# Patient Record
Sex: FEMALE | Race: WHITE | NOT HISPANIC OR LATINO | Employment: OTHER | URBAN - METROPOLITAN AREA
[De-identification: names, ages, dates, MRNs, and addresses within clinical notes are randomized per-mention and may not be internally consistent; named-entity substitution may affect disease eponyms.]

---

## 2018-07-19 ENCOUNTER — TELEPHONE (OUTPATIENT)
Dept: MEDICAL GROUP | Age: 73
End: 2018-07-19

## 2018-07-20 ENCOUNTER — OFFICE VISIT (OUTPATIENT)
Dept: MEDICAL GROUP | Age: 73
End: 2018-07-20
Payer: MEDICARE

## 2018-07-20 VITALS — BODY MASS INDEX: 22.88 KG/M2 | WEIGHT: 134 LBS | HEIGHT: 64 IN

## 2018-07-20 DIAGNOSIS — F43.10 PTSD (POST-TRAUMATIC STRESS DISORDER): ICD-10-CM

## 2018-07-20 PROBLEM — F43.21 GRIEF REACTION: Status: ACTIVE | Noted: 2018-07-20

## 2018-07-20 PROCEDURE — 99204 OFFICE O/P NEW MOD 45 MIN: CPT | Performed by: FAMILY MEDICINE

## 2018-07-20 RX ORDER — CITALOPRAM HYDROBROMIDE 10 MG/1
10 TABLET ORAL DAILY
Qty: 90 TAB | Refills: 0 | Status: SHIPPED | OUTPATIENT
Start: 2018-07-20 | End: 2019-04-24

## 2018-07-20 ASSESSMENT — PATIENT HEALTH QUESTIONNAIRE - PHQ9
SUM OF ALL RESPONSES TO PHQ QUESTIONS 1-9: 19
CLINICAL INTERPRETATION OF PHQ2 SCORE: 4
5. POOR APPETITE OR OVEREATING: 1 - SEVERAL DAYS
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
## 2019-04-24 ENCOUNTER — OFFICE VISIT (OUTPATIENT)
Dept: MEDICAL GROUP | Age: 74
End: 2019-04-24

## 2019-04-24 VITALS
DIASTOLIC BLOOD PRESSURE: 70 MMHG | WEIGHT: 140 LBS | SYSTOLIC BLOOD PRESSURE: 132 MMHG | BODY MASS INDEX: 23.9 KG/M2 | HEART RATE: 69 BPM | OXYGEN SATURATION: 97 % | HEIGHT: 64 IN | TEMPERATURE: 97.5 F

## 2019-04-24 DIAGNOSIS — F43.10 PTSD (POST-TRAUMATIC STRESS DISORDER): ICD-10-CM

## 2019-04-24 DIAGNOSIS — Z00.00 MEDICARE ANNUAL WELLNESS VISIT, SUBSEQUENT: Primary | ICD-10-CM

## 2019-04-24 PROBLEM — F43.21 GRIEF REACTION: Status: RESOLVED | Noted: 2018-07-20 | Resolved: 2019-04-24

## 2019-04-24 PROCEDURE — G0439 PPPS, SUBSEQ VISIT: HCPCS | Performed by: FAMILY MEDICINE

## 2019-04-24 RX ORDER — CITALOPRAM HYDROBROMIDE 10 MG/1
10 TABLET ORAL DAILY
Qty: 90 TAB | Refills: 3 | Status: SHIPPED | OUTPATIENT
Start: 2019-04-24

## 2019-04-24 ASSESSMENT — PATIENT HEALTH QUESTIONNAIRE - PHQ9: CLINICAL INTERPRETATION OF PHQ2 SCORE: 0

## 2019-04-24 ASSESSMENT — ACTIVITIES OF DAILY LIVING (ADL): BATHING_REQUIRES_ASSISTANCE: 0

## 2019-04-24 ASSESSMENT — ENCOUNTER SYMPTOMS: GENERAL WELL-BEING: GOOD
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
## 2021-01-11 DIAGNOSIS — Z23 NEED FOR VACCINATION: ICD-10-CM

## 2024-05-04 ENCOUNTER — ANESTHESIA EVENT (OUTPATIENT)
Dept: SURGERY | Facility: MEDICAL CENTER | Age: 79
DRG: 481 | End: 2024-05-04
Payer: MEDICARE

## 2024-05-04 ENCOUNTER — APPOINTMENT (OUTPATIENT)
Dept: RADIOLOGY | Facility: MEDICAL CENTER | Age: 79
DRG: 481 | End: 2024-05-04
Attending: STUDENT IN AN ORGANIZED HEALTH CARE EDUCATION/TRAINING PROGRAM
Payer: MEDICARE

## 2024-05-04 ENCOUNTER — APPOINTMENT (OUTPATIENT)
Dept: RADIOLOGY | Facility: MEDICAL CENTER | Age: 79
DRG: 481 | End: 2024-05-04
Attending: ORTHOPAEDIC SURGERY
Payer: MEDICARE

## 2024-05-04 ENCOUNTER — HOSPITAL ENCOUNTER (INPATIENT)
Facility: MEDICAL CENTER | Age: 79
LOS: 3 days | DRG: 481 | End: 2024-05-07
Attending: STUDENT IN AN ORGANIZED HEALTH CARE EDUCATION/TRAINING PROGRAM | Admitting: STUDENT IN AN ORGANIZED HEALTH CARE EDUCATION/TRAINING PROGRAM
Payer: MEDICARE

## 2024-05-04 ENCOUNTER — ANESTHESIA (OUTPATIENT)
Dept: SURGERY | Facility: MEDICAL CENTER | Age: 79
DRG: 481 | End: 2024-05-04
Payer: MEDICARE

## 2024-05-04 DIAGNOSIS — S72.001A HIP FRACTURE REQUIRING OPERATIVE REPAIR, RIGHT, CLOSED, INITIAL ENCOUNTER (HCC): ICD-10-CM

## 2024-05-04 DIAGNOSIS — I15.9 SECONDARY HYPERTENSION: ICD-10-CM

## 2024-05-04 DIAGNOSIS — N39.0 ACUTE UTI: ICD-10-CM

## 2024-05-04 DIAGNOSIS — S72.001A CLOSED FRACTURE OF RIGHT HIP, INITIAL ENCOUNTER (HCC): ICD-10-CM

## 2024-05-04 PROBLEM — I10 HYPERTENSION: Status: ACTIVE | Noted: 2024-05-04

## 2024-05-04 PROBLEM — R73.9 HYPERGLYCEMIA: Status: ACTIVE | Noted: 2024-05-04

## 2024-05-04 PROBLEM — M25.511 ACUTE PAIN OF RIGHT SHOULDER: Status: ACTIVE | Noted: 2024-05-04

## 2024-05-04 PROBLEM — E86.0 DEHYDRATION: Status: ACTIVE | Noted: 2024-05-04

## 2024-05-04 PROBLEM — Z71.89 ACP (ADVANCE CARE PLANNING): Status: ACTIVE | Noted: 2024-05-04

## 2024-05-04 LAB
ANION GAP SERPL CALC-SCNC: 12 MMOL/L (ref 7–16)
BUN SERPL-MCNC: 29 MG/DL (ref 8–22)
CALCIUM SERPL-MCNC: 8.9 MG/DL (ref 8.5–10.5)
CHLORIDE SERPL-SCNC: 107 MMOL/L (ref 96–112)
CK SERPL-CCNC: 213 U/L (ref 0–154)
CO2 SERPL-SCNC: 22 MMOL/L (ref 20–33)
CREAT SERPL-MCNC: 0.56 MG/DL (ref 0.5–1.4)
CRP SERPL HS-MCNC: <0.3 MG/DL (ref 0–0.75)
EKG IMPRESSION: NORMAL
ERYTHROCYTE [DISTWIDTH] IN BLOOD BY AUTOMATED COUNT: 46 FL (ref 35.9–50)
EST. AVERAGE GLUCOSE BLD GHB EST-MCNC: 117 MG/DL
GFR SERPLBLD CREATININE-BSD FMLA CKD-EPI: 92 ML/MIN/1.73 M 2
GLUCOSE SERPL-MCNC: 150 MG/DL (ref 65–99)
HBA1C MFR BLD: 5.7 % (ref 4–5.6)
HCT VFR BLD AUTO: 36 % (ref 37–47)
HGB BLD-MCNC: 12.2 G/DL (ref 12–16)
MCH RBC QN AUTO: 30.3 PG (ref 27–33)
MCHC RBC AUTO-ENTMCNC: 33.9 G/DL (ref 32.2–35.5)
MCV RBC AUTO: 89.6 FL (ref 81.4–97.8)
PLATELET # BLD AUTO: 169 K/UL (ref 164–446)
PMV BLD AUTO: 10.4 FL (ref 9–12.9)
POTASSIUM SERPL-SCNC: 3.7 MMOL/L (ref 3.6–5.5)
PROCALCITONIN SERPL-MCNC: <0.05 NG/ML
RBC # BLD AUTO: 4.02 M/UL (ref 4.2–5.4)
SODIUM SERPL-SCNC: 141 MMOL/L (ref 135–145)
WBC # BLD AUTO: 12.2 K/UL (ref 4.8–10.8)

## 2024-05-04 PROCEDURE — 99222 1ST HOSP IP/OBS MODERATE 55: CPT | Mod: 57 | Performed by: ORTHOPAEDIC SURGERY

## 2024-05-04 PROCEDURE — 0QS606Z REPOSITION RIGHT UPPER FEMUR WITH INTRAMEDULLARY INTERNAL FIXATION DEVICE, OPEN APPROACH: ICD-10-PCS | Performed by: ORTHOPAEDIC SURGERY

## 2024-05-04 PROCEDURE — 93010 ELECTROCARDIOGRAM REPORT: CPT | Performed by: INTERNAL MEDICINE

## 2024-05-04 PROCEDURE — 99497 ADVNCD CARE PLAN 30 MIN: CPT | Performed by: STUDENT IN AN ORGANIZED HEALTH CARE EDUCATION/TRAINING PROGRAM

## 2024-05-04 PROCEDURE — 99223 1ST HOSP IP/OBS HIGH 75: CPT | Mod: AI,25 | Performed by: STUDENT IN AN ORGANIZED HEALTH CARE EDUCATION/TRAINING PROGRAM

## 2024-05-04 PROCEDURE — 27235 TREAT THIGH FRACTURE: CPT | Mod: RT | Performed by: ORTHOPAEDIC SURGERY

## 2024-05-04 DEVICE — SCREW CANNULATED FULLY THREADED 7.3MM X 75MM (3TX3=9): Type: IMPLANTABLE DEVICE | Site: HIP | Status: FUNCTIONAL

## 2024-05-04 DEVICE — SCREW CANNULATED FULLY THREADED 7.3MM X 80MM (3TX3=9): Type: IMPLANTABLE DEVICE | Site: HIP | Status: FUNCTIONAL

## 2024-05-04 DEVICE — SCREW CANNULATED FULLY THREADED 7.3MM X 85MM (3TX3=9): Type: IMPLANTABLE DEVICE | Site: HIP | Status: FUNCTIONAL

## 2024-05-04 RX ORDER — CEFAZOLIN SODIUM 1 G/3ML
INJECTION, POWDER, FOR SOLUTION INTRAMUSCULAR; INTRAVENOUS PRN
Status: DISCONTINUED | OUTPATIENT
Start: 2024-05-04 | End: 2024-05-04 | Stop reason: SURG

## 2024-05-04 RX ORDER — BISACODYL 10 MG
10 SUPPOSITORY, RECTAL RECTAL
Status: DISCONTINUED | OUTPATIENT
Start: 2024-05-04 | End: 2024-05-07 | Stop reason: HOSPADM

## 2024-05-04 RX ORDER — ASPIRIN 81 MG/1
81 TABLET, CHEWABLE ORAL
COMMUNITY

## 2024-05-04 RX ORDER — ASPIRIN 81 MG/1
81 TABLET ORAL
Status: SHIPPED | COMMUNITY
End: 2024-05-04

## 2024-05-04 RX ORDER — ACETAMINOPHEN 325 MG/1
650 TABLET ORAL EVERY 6 HOURS
Status: DISCONTINUED | OUTPATIENT
Start: 2024-05-04 | End: 2024-05-04

## 2024-05-04 RX ORDER — DEXAMETHASONE SODIUM PHOSPHATE 4 MG/ML
4 INJECTION, SOLUTION INTRA-ARTICULAR; INTRALESIONAL; INTRAMUSCULAR; INTRAVENOUS; SOFT TISSUE
Status: DISCONTINUED | OUTPATIENT
Start: 2024-05-04 | End: 2024-05-07 | Stop reason: HOSPADM

## 2024-05-04 RX ORDER — SODIUM CHLORIDE, SODIUM LACTATE, POTASSIUM CHLORIDE, AND CALCIUM CHLORIDE .6; .31; .03; .02 G/100ML; G/100ML; G/100ML; G/100ML
500 INJECTION, SOLUTION INTRAVENOUS
Status: DISCONTINUED | OUTPATIENT
Start: 2024-05-04 | End: 2024-05-07 | Stop reason: HOSPADM

## 2024-05-04 RX ORDER — HYDRALAZINE HYDROCHLORIDE 20 MG/ML
10 INJECTION INTRAMUSCULAR; INTRAVENOUS EVERY 4 HOURS PRN
Status: DISCONTINUED | OUTPATIENT
Start: 2024-05-04 | End: 2024-05-07 | Stop reason: HOSPADM

## 2024-05-04 RX ORDER — ACETAMINOPHEN 325 MG/1
650 TABLET ORAL EVERY 6 HOURS PRN
Status: DISCONTINUED | OUTPATIENT
Start: 2024-05-09 | End: 2024-05-07 | Stop reason: HOSPADM

## 2024-05-04 RX ORDER — OXYCODONE HYDROCHLORIDE 5 MG/1
2.5 TABLET ORAL
Status: DISCONTINUED | OUTPATIENT
Start: 2024-05-04 | End: 2024-05-04

## 2024-05-04 RX ORDER — ENOXAPARIN SODIUM 100 MG/ML
40 INJECTION SUBCUTANEOUS
Status: DISCONTINUED | OUTPATIENT
Start: 2024-05-05 | End: 2024-05-07 | Stop reason: HOSPADM

## 2024-05-04 RX ORDER — MORPHINE SULFATE 4 MG/ML
4 INJECTION INTRAVENOUS ONCE
Status: COMPLETED | OUTPATIENT
Start: 2024-05-04 | End: 2024-05-04

## 2024-05-04 RX ORDER — ACETAMINOPHEN 325 MG/1
650 TABLET ORAL EVERY 6 HOURS PRN
Status: DISCONTINUED | OUTPATIENT
Start: 2024-05-04 | End: 2024-05-04

## 2024-05-04 RX ORDER — DEXAMETHASONE SODIUM PHOSPHATE 4 MG/ML
INJECTION, SOLUTION INTRA-ARTICULAR; INTRALESIONAL; INTRAMUSCULAR; INTRAVENOUS; SOFT TISSUE PRN
Status: DISCONTINUED | OUTPATIENT
Start: 2024-05-04 | End: 2024-05-04 | Stop reason: SURG

## 2024-05-04 RX ORDER — OMEPRAZOLE 20 MG/1
20 CAPSULE, DELAYED RELEASE ORAL 2 TIMES DAILY
Status: DISCONTINUED | OUTPATIENT
Start: 2024-05-04 | End: 2024-05-07 | Stop reason: HOSPADM

## 2024-05-04 RX ORDER — HYDROMORPHONE HYDROCHLORIDE 1 MG/ML
0.5 INJECTION, SOLUTION INTRAMUSCULAR; INTRAVENOUS; SUBCUTANEOUS
Status: DISCONTINUED | OUTPATIENT
Start: 2024-05-04 | End: 2024-05-07 | Stop reason: HOSPADM

## 2024-05-04 RX ORDER — AMOXICILLIN 250 MG
1 CAPSULE ORAL NIGHTLY
Status: DISCONTINUED | OUTPATIENT
Start: 2024-05-04 | End: 2024-05-07 | Stop reason: HOSPADM

## 2024-05-04 RX ORDER — LABETALOL HYDROCHLORIDE 5 MG/ML
5 INJECTION, SOLUTION INTRAVENOUS
Status: DISCONTINUED | OUTPATIENT
Start: 2024-05-04 | End: 2024-05-04 | Stop reason: HOSPADM

## 2024-05-04 RX ORDER — ENOXAPARIN SODIUM 100 MG/ML
40 INJECTION SUBCUTANEOUS DAILY
Status: DISCONTINUED | OUTPATIENT
Start: 2024-05-05 | End: 2024-05-04

## 2024-05-04 RX ORDER — POLYETHYLENE GLYCOL 3350 17 G/17G
1 POWDER, FOR SOLUTION ORAL
Status: DISCONTINUED | OUTPATIENT
Start: 2024-05-04 | End: 2024-05-04

## 2024-05-04 RX ORDER — OXYCODONE HCL 5 MG/5 ML
5 SOLUTION, ORAL ORAL
Status: COMPLETED | OUTPATIENT
Start: 2024-05-04 | End: 2024-05-04

## 2024-05-04 RX ORDER — KETOROLAC TROMETHAMINE 15 MG/ML
INJECTION, SOLUTION INTRAMUSCULAR; INTRAVENOUS PRN
Status: DISCONTINUED | OUTPATIENT
Start: 2024-05-04 | End: 2024-05-04 | Stop reason: SURG

## 2024-05-04 RX ORDER — ACETAMINOPHEN 325 MG/1
650 TABLET ORAL EVERY 6 HOURS
Status: DISCONTINUED | OUTPATIENT
Start: 2024-05-04 | End: 2024-05-07 | Stop reason: HOSPADM

## 2024-05-04 RX ORDER — OXYCODONE HYDROCHLORIDE 5 MG/1
10 TABLET ORAL
Status: DISCONTINUED | OUTPATIENT
Start: 2024-05-04 | End: 2024-05-07 | Stop reason: HOSPADM

## 2024-05-04 RX ORDER — IPRATROPIUM BROMIDE AND ALBUTEROL SULFATE 2.5; .5 MG/3ML; MG/3ML
3 SOLUTION RESPIRATORY (INHALATION)
Status: DISCONTINUED | OUTPATIENT
Start: 2024-05-04 | End: 2024-05-07 | Stop reason: HOSPADM

## 2024-05-04 RX ORDER — METHOCARBAMOL 750 MG/1
750 TABLET, FILM COATED ORAL 4 TIMES DAILY PRN
Status: DISCONTINUED | OUTPATIENT
Start: 2024-05-04 | End: 2024-05-07 | Stop reason: HOSPADM

## 2024-05-04 RX ORDER — SODIUM CHLORIDE, SODIUM LACTATE, POTASSIUM CHLORIDE, CALCIUM CHLORIDE 600; 310; 30; 20 MG/100ML; MG/100ML; MG/100ML; MG/100ML
INJECTION, SOLUTION INTRAVENOUS CONTINUOUS
Status: DISCONTINUED | OUTPATIENT
Start: 2024-05-04 | End: 2024-05-04 | Stop reason: HOSPADM

## 2024-05-04 RX ORDER — DOCUSATE SODIUM 100 MG/1
100 CAPSULE, LIQUID FILLED ORAL 2 TIMES DAILY
Status: DISCONTINUED | OUTPATIENT
Start: 2024-05-04 | End: 2024-05-07 | Stop reason: HOSPADM

## 2024-05-04 RX ORDER — SODIUM CHLORIDE, SODIUM LACTATE, POTASSIUM CHLORIDE, CALCIUM CHLORIDE 600; 310; 30; 20 MG/100ML; MG/100ML; MG/100ML; MG/100ML
INJECTION, SOLUTION INTRAVENOUS
Status: DISCONTINUED | OUTPATIENT
Start: 2024-05-04 | End: 2024-05-04 | Stop reason: SURG

## 2024-05-04 RX ORDER — LABETALOL HYDROCHLORIDE 5 MG/ML
10 INJECTION, SOLUTION INTRAVENOUS EVERY 4 HOURS PRN
Status: DISCONTINUED | OUTPATIENT
Start: 2024-05-04 | End: 2024-05-07 | Stop reason: HOSPADM

## 2024-05-04 RX ORDER — HYDRALAZINE HYDROCHLORIDE 20 MG/ML
5 INJECTION INTRAMUSCULAR; INTRAVENOUS
Status: DISCONTINUED | OUTPATIENT
Start: 2024-05-04 | End: 2024-05-04 | Stop reason: HOSPADM

## 2024-05-04 RX ORDER — LIDOCAINE 4 G/G
1 PATCH TOPICAL EVERY 24 HOURS
Status: DISCONTINUED | OUTPATIENT
Start: 2024-05-04 | End: 2024-05-07 | Stop reason: HOSPADM

## 2024-05-04 RX ORDER — OXYCODONE HCL 5 MG/5 ML
2.5 SOLUTION, ORAL ORAL
Status: COMPLETED | OUTPATIENT
Start: 2024-05-04 | End: 2024-05-04

## 2024-05-04 RX ORDER — HALOPERIDOL 5 MG/ML
1 INJECTION INTRAMUSCULAR EVERY 6 HOURS PRN
Status: DISCONTINUED | OUTPATIENT
Start: 2024-05-04 | End: 2024-05-05

## 2024-05-04 RX ORDER — OXYCODONE HYDROCHLORIDE 5 MG/1
5 TABLET ORAL
Status: DISCONTINUED | OUTPATIENT
Start: 2024-05-04 | End: 2024-05-04

## 2024-05-04 RX ORDER — ENEMA 19; 7 G/133ML; G/133ML
1 ENEMA RECTAL
Status: DISCONTINUED | OUTPATIENT
Start: 2024-05-04 | End: 2024-05-07 | Stop reason: HOSPADM

## 2024-05-04 RX ORDER — ONDANSETRON 2 MG/ML
4 INJECTION INTRAMUSCULAR; INTRAVENOUS EVERY 4 HOURS PRN
Status: DISCONTINUED | OUTPATIENT
Start: 2024-05-04 | End: 2024-05-07 | Stop reason: HOSPADM

## 2024-05-04 RX ORDER — ONDANSETRON 4 MG/1
4 TABLET, ORALLY DISINTEGRATING ORAL EVERY 4 HOURS PRN
Status: DISCONTINUED | OUTPATIENT
Start: 2024-05-04 | End: 2024-05-07 | Stop reason: HOSPADM

## 2024-05-04 RX ORDER — KETOROLAC TROMETHAMINE 15 MG/ML
15 INJECTION, SOLUTION INTRAMUSCULAR; INTRAVENOUS EVERY 6 HOURS
Status: DISCONTINUED | OUTPATIENT
Start: 2024-05-04 | End: 2024-05-04

## 2024-05-04 RX ORDER — POLYETHYLENE GLYCOL 3350 17 G/17G
1 POWDER, FOR SOLUTION ORAL 2 TIMES DAILY PRN
Status: DISCONTINUED | OUTPATIENT
Start: 2024-05-04 | End: 2024-05-07 | Stop reason: HOSPADM

## 2024-05-04 RX ORDER — DIPHENHYDRAMINE HYDROCHLORIDE 50 MG/ML
12.5 INJECTION INTRAMUSCULAR; INTRAVENOUS
Status: DISCONTINUED | OUTPATIENT
Start: 2024-05-04 | End: 2024-05-04 | Stop reason: HOSPADM

## 2024-05-04 RX ORDER — OXYCODONE HYDROCHLORIDE 5 MG/1
5 TABLET ORAL
Status: DISCONTINUED | OUTPATIENT
Start: 2024-05-04 | End: 2024-05-07 | Stop reason: HOSPADM

## 2024-05-04 RX ORDER — IBUPROFEN 200 MG
800 TABLET ORAL 3 TIMES DAILY PRN
Status: DISCONTINUED | OUTPATIENT
Start: 2024-05-07 | End: 2024-05-07 | Stop reason: HOSPADM

## 2024-05-04 RX ORDER — ACETAMINOPHEN 325 MG/1
650 TABLET ORAL EVERY 6 HOURS PRN
Status: DISCONTINUED | OUTPATIENT
Start: 2024-05-09 | End: 2024-05-04

## 2024-05-04 RX ORDER — HYDROMORPHONE HYDROCHLORIDE 1 MG/ML
0.25 INJECTION, SOLUTION INTRAMUSCULAR; INTRAVENOUS; SUBCUTANEOUS
Status: DISCONTINUED | OUTPATIENT
Start: 2024-05-04 | End: 2024-05-04

## 2024-05-04 RX ORDER — ONDANSETRON 2 MG/ML
INJECTION INTRAMUSCULAR; INTRAVENOUS PRN
Status: DISCONTINUED | OUTPATIENT
Start: 2024-05-04 | End: 2024-05-04 | Stop reason: SURG

## 2024-05-04 RX ORDER — HALOPERIDOL 5 MG/ML
1 INJECTION INTRAMUSCULAR
Status: DISCONTINUED | OUTPATIENT
Start: 2024-05-04 | End: 2024-05-04 | Stop reason: HOSPADM

## 2024-05-04 RX ORDER — AMOXICILLIN 250 MG
2 CAPSULE ORAL EVERY EVENING
Status: DISCONTINUED | OUTPATIENT
Start: 2024-05-04 | End: 2024-05-04

## 2024-05-04 RX ORDER — EPHEDRINE SULFATE 50 MG/ML
5 INJECTION, SOLUTION INTRAVENOUS
Status: DISCONTINUED | OUTPATIENT
Start: 2024-05-04 | End: 2024-05-04 | Stop reason: HOSPADM

## 2024-05-04 RX ORDER — KETOROLAC TROMETHAMINE 15 MG/ML
15 INJECTION, SOLUTION INTRAMUSCULAR; INTRAVENOUS EVERY 6 HOURS
Status: DISCONTINUED | OUTPATIENT
Start: 2024-05-04 | End: 2024-05-07 | Stop reason: HOSPADM

## 2024-05-04 RX ORDER — VITAMIN B COMPLEX
1000 TABLET ORAL DAILY
Status: DISCONTINUED | OUTPATIENT
Start: 2024-05-04 | End: 2024-05-07 | Stop reason: HOSPADM

## 2024-05-04 RX ORDER — SCOLOPAMINE TRANSDERMAL SYSTEM 1 MG/1
1 PATCH, EXTENDED RELEASE TRANSDERMAL
Status: DISCONTINUED | OUTPATIENT
Start: 2024-05-04 | End: 2024-05-07 | Stop reason: HOSPADM

## 2024-05-04 RX ORDER — DIPHENHYDRAMINE HYDROCHLORIDE 50 MG/ML
25 INJECTION INTRAMUSCULAR; INTRAVENOUS EVERY 6 HOURS PRN
Status: DISCONTINUED | OUTPATIENT
Start: 2024-05-04 | End: 2024-05-07 | Stop reason: HOSPADM

## 2024-05-04 RX ORDER — SODIUM CHLORIDE 9 MG/ML
INJECTION, SOLUTION INTRAVENOUS CONTINUOUS
Status: DISCONTINUED | OUTPATIENT
Start: 2024-05-04 | End: 2024-05-05

## 2024-05-04 RX ORDER — ONDANSETRON 2 MG/ML
4 INJECTION INTRAMUSCULAR; INTRAVENOUS EVERY 4 HOURS PRN
Status: DISCONTINUED | OUTPATIENT
Start: 2024-05-04 | End: 2024-05-04

## 2024-05-04 RX ORDER — AMOXICILLIN 250 MG
1 CAPSULE ORAL
Status: DISCONTINUED | OUTPATIENT
Start: 2024-05-04 | End: 2024-05-07 | Stop reason: HOSPADM

## 2024-05-04 RX ORDER — IBUPROFEN 800 MG/1
800 TABLET ORAL 3 TIMES DAILY PRN
Status: DISCONTINUED | OUTPATIENT
Start: 2024-05-07 | End: 2024-05-04

## 2024-05-04 RX ADMIN — DEXAMETHASONE SODIUM PHOSPHATE 10 MG: 4 INJECTION INTRA-ARTICULAR; INTRALESIONAL; INTRAMUSCULAR; INTRAVENOUS; SOFT TISSUE at 14:58

## 2024-05-04 RX ADMIN — MORPHINE SULFATE 4 MG: 4 INJECTION INTRAVENOUS at 05:52

## 2024-05-04 RX ADMIN — KETOROLAC TROMETHAMINE 15 MG: 15 INJECTION, SOLUTION INTRAMUSCULAR; INTRAVENOUS at 17:11

## 2024-05-04 RX ADMIN — MORPHINE SULFATE 4 MG: 4 INJECTION INTRAVENOUS at 04:04

## 2024-05-04 RX ADMIN — KETOROLAC TROMETHAMINE 15 MG: 15 INJECTION, SOLUTION INTRAMUSCULAR; INTRAVENOUS at 07:33

## 2024-05-04 RX ADMIN — PROPOFOL 120 MG: 10 INJECTION, EMULSION INTRAVENOUS at 14:56

## 2024-05-04 RX ADMIN — CEFAZOLIN 2 G: 2 INJECTION, POWDER, FOR SOLUTION INTRAMUSCULAR; INTRAVENOUS at 23:14

## 2024-05-04 RX ADMIN — KETOROLAC TROMETHAMINE 15 MG: 15 INJECTION, SOLUTION INTRAMUSCULAR; INTRAVENOUS at 15:22

## 2024-05-04 RX ADMIN — ONDANSETRON 8 MG: 2 INJECTION INTRAMUSCULAR; INTRAVENOUS at 15:22

## 2024-05-04 RX ADMIN — SODIUM CHLORIDE: 9 INJECTION, SOLUTION INTRAVENOUS at 20:10

## 2024-05-04 RX ADMIN — LIDOCAINE 1 PATCH: 4 PATCH TOPICAL at 07:34

## 2024-05-04 RX ADMIN — KETOROLAC TROMETHAMINE 15 MG: 15 INJECTION, SOLUTION INTRAMUSCULAR; INTRAVENOUS at 23:12

## 2024-05-04 RX ADMIN — ACETAMINOPHEN 650 MG: 325 TABLET, FILM COATED ORAL at 17:11

## 2024-05-04 RX ADMIN — OMEPRAZOLE 20 MG: 20 CAPSULE, DELAYED RELEASE ORAL at 17:13

## 2024-05-04 RX ADMIN — OXYCODONE HYDROCHLORIDE 5 MG: 5 SOLUTION ORAL at 15:47

## 2024-05-04 RX ADMIN — OMEPRAZOLE 20 MG: 20 CAPSULE, DELAYED RELEASE ORAL at 07:33

## 2024-05-04 RX ADMIN — SODIUM CHLORIDE: 9 INJECTION, SOLUTION INTRAVENOUS at 07:37

## 2024-05-04 RX ADMIN — ACETAMINOPHEN 650 MG: 325 TABLET, FILM COATED ORAL at 07:33

## 2024-05-04 RX ADMIN — ACETAMINOPHEN 650 MG: 325 TABLET, FILM COATED ORAL at 11:26

## 2024-05-04 RX ADMIN — KETOROLAC TROMETHAMINE 15 MG: 15 INJECTION, SOLUTION INTRAMUSCULAR; INTRAVENOUS at 11:26

## 2024-05-04 RX ADMIN — CEFAZOLIN 2 G: 1 INJECTION, POWDER, FOR SOLUTION INTRAMUSCULAR; INTRAVENOUS at 14:58

## 2024-05-04 RX ADMIN — FENTANYL CITRATE 25 MCG: 50 INJECTION, SOLUTION INTRAMUSCULAR; INTRAVENOUS at 15:55

## 2024-05-04 RX ADMIN — DOCUSATE SODIUM 100 MG: 100 CAPSULE, LIQUID FILLED ORAL at 17:11

## 2024-05-04 RX ADMIN — FENTANYL CITRATE 25 MCG: 50 INJECTION, SOLUTION INTRAMUSCULAR; INTRAVENOUS at 16:00

## 2024-05-04 RX ADMIN — SODIUM CHLORIDE, POTASSIUM CHLORIDE, SODIUM LACTATE AND CALCIUM CHLORIDE: 600; 310; 30; 20 INJECTION, SOLUTION INTRAVENOUS at 14:53

## 2024-05-04 RX ADMIN — Medication 1000 UNITS: at 07:34

## 2024-05-04 ASSESSMENT — ENCOUNTER SYMPTOMS
FALLS: 1
SHORTNESS OF BREATH: 0
CHILLS: 0
VOMITING: 0
WEAKNESS: 1
ABDOMINAL PAIN: 0
PALPITATIONS: 0
BACK PAIN: 0
HEARTBURN: 0
NECK PAIN: 0
DEPRESSION: 0
MYALGIAS: 1
NAUSEA: 0
BRUISES/BLEEDS EASILY: 0
DOUBLE VISION: 0
DIZZINESS: 0
BLURRED VISION: 0
FOCAL WEAKNESS: 0
COUGH: 0
HEADACHES: 0
FEVER: 0

## 2024-05-04 ASSESSMENT — COGNITIVE AND FUNCTIONAL STATUS - GENERAL
MOVING FROM LYING ON BACK TO SITTING ON SIDE OF FLAT BED: A LITTLE
DRESSING REGULAR LOWER BODY CLOTHING: A LITTLE
STANDING UP FROM CHAIR USING ARMS: A LITTLE
TOILETING: A LITTLE
DAILY ACTIVITIY SCORE: 19
TURNING FROM BACK TO SIDE WHILE IN FLAT BAD: A LITTLE
SUGGESTED CMS G CODE MODIFIER DAILY ACTIVITY: CK
MOVING TO AND FROM BED TO CHAIR: A LITTLE
SUGGESTED CMS G CODE MODIFIER MOBILITY: CL
MOBILITY SCORE: 14
CLIMB 3 TO 5 STEPS WITH RAILING: TOTAL
HELP NEEDED FOR BATHING: A LITTLE
WALKING IN HOSPITAL ROOM: TOTAL
DRESSING REGULAR UPPER BODY CLOTHING: A LITTLE
PERSONAL GROOMING: A LITTLE

## 2024-05-04 ASSESSMENT — PAIN DESCRIPTION - PAIN TYPE
TYPE: ACUTE PAIN
TYPE: ACUTE PAIN
TYPE: ACUTE PAIN;SURGICAL PAIN
TYPE: ACUTE PAIN
TYPE: ACUTE PAIN;SURGICAL PAIN
TYPE: ACUTE PAIN
TYPE: ACUTE PAIN

## 2024-05-04 ASSESSMENT — PAIN SCALES - GENERAL: PAIN_LEVEL: 0

## 2024-05-04 ASSESSMENT — LIFESTYLE VARIABLES: SUBSTANCE_ABUSE: 0

## 2024-05-04 NOTE — ANESTHESIA PROCEDURE NOTES
Airway    Date/Time: 5/4/2024 2:56 PM    Performed by: Cris Power M.D.  Authorized by: Cris Power M.D.    Location:  OR  Urgency:  Elective  Indications for Airway Management:  Anesthesia      Spontaneous Ventilation: absent    Sedation Level:  Deep  Preoxygenated: Yes    Mask Difficulty Assessment:  0 - not attempted  Final Airway Type:  Supraglottic airway  Final Supraglottic Airway:  Standard LMA    SGA Size:  4  Airway Seal Pressure (cm H2O):  22  Number of Attempts at Approach:  1

## 2024-05-04 NOTE — ED NOTES
"Med rec complete per patient  Allergies reviewed  Outpatient antibiotics in the last 30 days? No   Anticoagulants taken in the last 14 days? No    Unable to clarify why/when patient takes Aspirin, patient states \"for the flavor\"     Pharmacy patient utilizes: [facility] on [address] Kendra SUNNI Roberts, CPhT    "

## 2024-05-04 NOTE — ANESTHESIA POSTPROCEDURE EVALUATION
Patient: Shira Hollins    Procedure Summary       Date: 05/04/24 Room / Location: [place] [address] / SURGERY [facility]    Anesthesia Start: 1453 Anesthesia Stop: 1528    Procedure: FIXATION, HIP, USING CANNULATED SCREW (Right: Hip) Diagnosis: (right femoral neck fracture)    Surgeons: David Viera M.D. Responsible Provider: Cris Power M.D.    Anesthesia Type: general ASA Status: 3 - Emergent            Final Anesthesia Type: general  Last vitals  BP   Blood Pressure : 118/74    Temp   36.9 °C (98.4 °F)    Pulse   72   Resp   17    SpO2   93 %      Anesthesia Post Evaluation    Patient location during evaluation: PACU  Patient participation: complete - patient participated  Level of consciousness: awake and alert  Pain score: 0    Airway patency: patent  Anesthetic complications: no  Cardiovascular status: hemodynamically stable  Respiratory status: acceptable  Hydration status: euvolemic    PONV: none          No notable events documented.

## 2024-05-04 NOTE — PROGRESS NOTES
4 Eyes Skin Assessment Completed by MAEGAN Grijalva and MAEGAN Camacho.    Head WDL  Ears WDL  Nose WDL  Mouth WDL  Neck WDL  Breast/Chest WDL  Shoulder Blades WDL  Spine WDL  (R) Arm/Elbow/Hand WDL  (L) Arm/Elbow/Hand WDL  Abdomen WDL  Groin WDL  Scrotum/Coccyx/Buttocks Redness and Blanching  (R) Leg Redness  on knee. Bruising on the  Hip  (L) Leg WDL  (R) Heel/Foot/Toe Boggy, slow to francesca.  (L) Heel/Foot/Toe Boggy, slow to francesca          Devices In Places Pulse Ox and SCD's      Interventions In Place Heel Mepilex, TAP System, Pillows, and Barrier Cream    Possible Skin Injury Yes    Pictures Uploaded Into Epic N/A  Wound Consult Placed N/A  RN Wound Prevention Protocol Ordered No

## 2024-05-04 NOTE — ASSESSMENT & PLAN NOTE
S/p Percutaneous fixation of femoral fracture.      PT/OT recommending postacute placement  PMR consulted  Requiring IV narcotics for pain management, monitor for toxicity  Case discussed with orthopedics KURT Morillo

## 2024-05-04 NOTE — ANESTHESIA TIME REPORT
Anesthesia Start and Stop Event Times       Date Time Event    5/4/2024 1441 Ready for Procedure     1453 Anesthesia Start     1528 Anesthesia Stop          Responsible Staff  05/04/24      Name Role Begin End    Cris Power M.D. Anesth 1453 1528          Overtime Reason:  no overtime (within assigned shift)    Comments:

## 2024-05-04 NOTE — ED PROVIDER NOTES
"ED Provider Note    CHIEF COMPLAINT  Chief Complaint   Patient presents with    Shoulder Pain     R shoulder pain post GLF     Hip Pain     R hip pain post GLF     T-5000 GLF     Slipped off edge of bed. Denies head strike, -LOC, -thinners. C/o R hip and R shoulder pain after fall. CMS intact.        EXTERNAL RECORDS REVIEWED  Outpatient Notes family medicine visit from 4/24/2019 noted history of PTSD    HPI/ROS  LIMITATION TO HISTORY     OUTSIDE HISTORIAN(S):  Family patient's daughter at bedside    Shira Hollins is a 78 y.o. female who presents after fall.  Patient says that she slipped off the edge of bed onto her right side.  Patient's daughter says that she spoke with patient over the phone patient could not get up off the floor.  Daughter says that patient has mild dementia otherwise is acting her self.  Patient complains of pain in her right hip.  Patient denies blood thinner use.    PAST MEDICAL HISTORY       SURGICAL HISTORY  patient denies any surgical history    FAMILY HISTORY  Family History   Problem Relation Age of Onset    No Known Problems Mother     No Known Problems Father     No Known Problems Sister        SOCIAL HISTORY  Social History     Tobacco Use    Smoking status: Never    Smokeless tobacco: Never   Vaping Use    Vaping Use: Never used   Substance and Sexual Activity    Alcohol use: No    Drug use: No    Sexual activity: Never       CURRENT MEDICATIONS  Home Medications       Reviewed by Brigette Brito (Pharmacy Tech) on 05/04/24 at 0558  Med List Status: Complete     Medication Last Dose Status   aspirin 81 MG EC tablet 5/3/2024 Active                    ALLERGIES  No Known Allergies    PHYSICAL EXAM  VITAL SIGNS: BP (!) 150/69   Pulse 72   Temp 36.8 °C (98.3 °F) (Temporal)   Resp 15   Ht 1.626 m (5' 4\")   Wt 59 kg (130 lb)   SpO2 96%   BMI 22.31 kg/m²    Constitutional: Alert in no apparent distress.  HENT: No signs of trauma, Bilateral external ears normal, Nose normal. "   Eyes: Pupils are equal and reactive, Conjunctiva normal, Non-icteric.   Neck: Normal range of motion, No tenderness, Supple, No stridor.   Cardiovascular: Regular rate and rhythm, no murmurs.   Thorax & Lungs: Normal breath sounds, No respiratory distress, No wheezing, No chest tenderness.   Abdomen: Bowel sounds normal, Soft, No tenderness, No masses, No pulsatile masses.   Skin: Warm, Dry, No erythema, No rash.   Back: No bony tenderness  Extremities: Intact distal pulses, No edema, No tenderness, No cyanosis  Musculoskeletal: Tenderness along right hip, reduced range of motion of right hip due to pain, soft compartments throughout bilateral lower extremities, normal PT/DP bilaterally otherwise good range of motion in all major joints. No tenderness to palpation or major deformities noted.   Neurologic: Alert , Normal motor function, Normal sensory function, No focal deficits noted.       EKG/LABS  Labs Reviewed   BASIC METABOLIC PANEL - Abnormal; Notable for the following components:       Result Value    Glucose 150 (*)     Bun 29 (*)     All other components within normal limits   ESTIMATED GFR   CBC WITHOUT DIFFERENTIAL   URINALYSIS   URINE CULTURE(NEW)   HEMOGLOBIN A1C   CRP QUANTITIVE (NON-CARDIAC)           RADIOLOGY/PROCEDURES   I have independently interpreted the diagnostic imaging associated with this visit and am waiting the final reading from the radiologist.   My preliminary interpretation is as follows: Right sided femur    Radiologist interpretation:  CT-HEAD W/O   Final Result         1.  No acute intracranial abnormality is identified, there are nonspecific white matter changes, commonly associated with small vessel ischemic disease.  Associated mild cerebral atrophy is noted.   2.  Mild bilateral ventricular dilatation, could represent ex vacuo changes, consider component of normal pressure hydrocephalus with additional workup as clinically appropriate   3.  Atherosclerosis.          DX-HIP-COMPLETE - UNILATERAL 2+ RIGHT   Final Result         1.  Impacted subcapital right femoral neck fracture.      DX-SHOULDER 2+ RIGHT   Final Result         1.  No acute traumatic bony injury.   2.  Right infrahilar infiltrate         CT-SHOULDER W/O PLUS RECONS RIGHT    (Results Pending)       COURSE & MEDICAL DECISION MAKING    ASSESSMENT, COURSE AND PLAN  Care Narrative: On arrival patient noted to be hypertensive vital signs otherwise within normal limits.  Suspect right-sided femur fracture given significant tenderness limited range of motion.  Patient has normal neurovascular exam and soft compartments.  Given patient's age will obtain CT head to assess for intracranial hemorrhage.  X-ray of right shoulder without signs of acute fracture or dislocation.  Hip x-ray does show subcapital neck fracture.  Patient required second dose of IV analgesics.  Spoke with Dr. Viera orthopedic surgery who plans for operative repair today.  Spoke with hospitalist regarding admission for right-sided femur fracture.            ADDITIONAL PROBLEMS MANAGED      DISPOSITION AND DISCUSSIONS  I have discussed management of the patient with the following physicians and YELITZA's: Orthopedic surgery, hospitalist      FINAL DIAGNOSIS  1. Closed fracture of right hip, initial encounter (HCC)           Electronically signed by: Paulino Ortiz D.O., 5/4/2024 3:38 AM

## 2024-05-04 NOTE — ANESTHESIA PREPROCEDURE EVALUATION
Case: [id number] Date/Time: 05/04/24 1436    Procedure: FIXATION, HIP, USING CANNULATED SCREW (Left)    Location: [place] [address] / SURGERY [facility]    Surgeons: David Viera M.D.          79 yo F with GLF and broken hip, here for hip screw    Relevant Problems   CARDIAC   (positive) Hypertension      Other   (positive) Hip fracture requiring operative repair, right, closed, initial encounter ([facility])   (positive) PTSD (post-traumatic stress disorder)       Physical Exam    Airway   Mallampati: I  TM distance: >3 FB  Neck ROM: full       Cardiovascular - normal exam  Rhythm: regular  Rate: normal  (-) murmur     Dental - normal exam  (+) upper dentures           Pulmonary - normal exam  Breath sounds clear to auscultation     Abdominal    Neurological - normal exam                   Anesthesia Plan    ASA 3- EMERGENT   ASA physical status 3 criteria: hypertension - poorly controlledASA physical status emergent criteria: displaced fracture with possible neurovascular compromise    Plan - general       Airway plan will be LMA          Induction: intravenous    Postoperative Plan: Postoperative administration of opioids is intended.    Pertinent diagnostic labs and testing reviewed    Informed Consent:    Anesthetic plan and risks discussed with patient.    Use of blood products discussed with: patient whom consented to blood products.

## 2024-05-04 NOTE — H&P
Hospital Medicine History & Physical Note    Date of Service  5/4/2024    Primary Care Physician  Pcp Pt States None    Consultants  Orthopedic (Dr. Viera)    Code Status  Full Code    Chief Complaint  Chief Complaint   Patient presents with    Shoulder Pain     R shoulder pain post GLF     Hip Pain     R hip pain post GLF     T-5000 GLF     Slipped off edge of bed. Denies head strike, -LOC, -thinners. C/o R hip and R shoulder pain after fall. CMS intact.        History of Presenting Illness  Shira Hollins is a 78 y.o. female who presented 5/4/2024 with evaluation for ground-level fall.  Patient reported falling out of her bed on the right side.  She has no PMH and does not take any meds.  In ER, no acute findings on CT head.  Noted to have impacted right subcapital femoral neck fracture.  X-ray of right shoulder does not show any acute bony fracture.  However, due to persistent concern of right shoulder pain, CT shoulder ordered.  Orthopedic was consulted, tentative plan for operative intervention.  Therefore, admitted to medicine service for further evaluation and treatment.    I discussed the plan of care with patient, family, bedside RN, and pharmacy.    Review of Systems  Review of Systems   Constitutional:  Negative for chills and fever.   HENT:  Negative for hearing loss and tinnitus.    Eyes:  Negative for blurred vision and double vision.   Respiratory:  Negative for cough and shortness of breath.    Cardiovascular:  Negative for chest pain and palpitations.   Gastrointestinal:  Negative for abdominal pain, heartburn, nausea and vomiting.   Genitourinary:  Negative for dysuria and urgency.   Musculoskeletal:  Positive for falls, joint pain and myalgias. Negative for back pain and neck pain.   Skin:  Negative for itching and rash.   Neurological:  Positive for weakness. Negative for dizziness, focal weakness and headaches.   Endo/Heme/Allergies:  Negative for environmental allergies. Does not  bruise/bleed easily.   Psychiatric/Behavioral:  Negative for depression and substance abuse.    All other systems reviewed and are negative.      Past Medical History   has no past medical history on file.    Surgical History   has no past surgical history on file.     Family History  family history includes No Known Problems in her father, mother, and sister.   Family history reviewed with patient. There is no family history that is pertinent to the chief complaint.     Social History   reports that she has never smoked. She has never used smokeless tobacco. She reports that she does not drink alcohol and does not use drugs.    Allergies  No Known Allergies    Medications  Prior to Admission Medications   Prescriptions Last Dose Informant Patient Reported? Taking?   citalopram (CELEXA) 10 MG tablet   No No   Sig: Take 1 Tab by mouth every day.      Facility-Administered Medications: None       Physical Exam  Temp:  [36.8 °C (98.3 °F)] 36.8 °C (98.3 °F)  Pulse:  [60-83] 72  Resp:  [15-17] 15  BP: (150-174)/(69-79) 150/69  SpO2:  [96 %-97 %] 96 %  Blood Pressure : (!) 168/72   Temperature: 36.8 °C (98.3 °F)   Pulse: 61   Respiration: 15   Pulse Oximetry: 96 %       Physical Exam  Vitals and nursing note reviewed.   Constitutional:       General: She is not in acute distress.  HENT:      Head: Normocephalic and atraumatic.      Nose: Nose normal.      Mouth/Throat:      Mouth: Mucous membranes are dry.      Pharynx: Oropharynx is clear.   Eyes:      General: No scleral icterus.     Extraocular Movements: Extraocular movements intact.   Cardiovascular:      Rate and Rhythm: Normal rate and regular rhythm.   Pulmonary:      Effort: No respiratory distress.      Breath sounds: No wheezing or rales.   Chest:      Chest wall: No tenderness.   Abdominal:      General: There is no distension.      Tenderness: There is no abdominal tenderness. There is no guarding or rebound.   Musculoskeletal:         General: Tenderness and  "signs of injury present.      Cervical back: Neck supple. No tenderness.      Comments: RUE -- tender at shoulder, decreased ROM  RLE -- tender at hip, decreased ROM   Skin:     General: Skin is warm and dry.      Capillary Refill: Capillary refill takes less than 2 seconds.   Neurological:      General: No focal deficit present.      Mental Status: She is alert and oriented to person, place, and time.   Psychiatric:         Mood and Affect: Mood normal.         Laboratory:      Recent Labs     05/04/24  0412   SODIUM 141   POTASSIUM 3.7   CHLORIDE 107   CO2 22   GLUCOSE 150*   BUN 29*   CREATININE 0.56   CALCIUM 8.9     Recent Labs     05/04/24  0412   GLUCOSE 150*         No results for input(s): \"NTPROBNP\" in the last 72 hours.      No results for input(s): \"TROPONINT\" in the last 72 hours.    Imaging:  CT-HEAD W/O   Final Result         1.  No acute intracranial abnormality is identified, there are nonspecific white matter changes, commonly associated with small vessel ischemic disease.  Associated mild cerebral atrophy is noted.   2.  Mild bilateral ventricular dilatation, could represent ex vacuo changes, consider component of normal pressure hydrocephalus with additional workup as clinically appropriate   3.  Atherosclerosis.         DX-HIP-COMPLETE - UNILATERAL 2+ RIGHT   Final Result         1.  Impacted subcapital right femoral neck fracture.      DX-SHOULDER 2+ RIGHT   Final Result         1.  No acute traumatic bony injury.   2.  Right infrahilar infiltrate         CT-SHOULDER W/O PLUS RECONS RIGHT    (Results Pending)       X-Ray:  I have personally reviewed the images and compared with prior images.  EKG:  I have personally reviewed the images and compared with prior images.    Assessment/Plan:  Justification for Admission Status  I anticipate this patient  will require at least 2 midnights hospitalization, therefore appropriate for inpatient status.      * Hip fracture requiring operative repair, " right, closed, initial encounter (HCC)- (present on admission)  Assessment & Plan  S/p GLF  X-ray right hip: Impacted subcapital right femoral neck fracture  Supportive pain control  PT/OT after OR  Orthopedic consulted, planned for operative intervention today 5/4    Acute pain of right shoulder  Assessment & Plan  S/p fall  X-ray right shoulder: No acute traumatic bony injury  Check CT right shoulder  Supportive pain control    Dehydration  Assessment & Plan  IVF    Hypertension  Assessment & Plan  Does not take any home meds  SBP> 160  Control pain  As needed IV labetalol, IV hydralazine    ACP (advance care planning)  Assessment & Plan  Goal care discussed with patient and patient's daughter at bedside in ER.  She stated she is agreeable for noninvasive, as well as invasive/heroic life-sustaining measures-including CPR/defibrillation/intubation or mechanical ventilation if needed.  She is also agreeable for proposed orthopedic surgical intervention as warranted.  Diagnosis, prognosis, question and concern addressed.  Full CODE STATUS confirmed.  ACP: 16 minutes    Hyperglycemia  Assessment & Plan  Check HbA1c        VTE prophylaxis: enoxaparin ppx

## 2024-05-04 NOTE — PROGRESS NOTES
Admitted early this a.m.  Please refer to H&P for details        Patient seen and examined at bedside.  Vitals been stable.  Labs reviewed noted to have leukocytosis which is a reactive, elevated CPK, renal function stable.  Imaging reviewed noted with right femoral neck fracture, right shoulder x-ray noted with no acute traumatic bony injury, right infrahilar infiltrate.        On IV narcotics for pain management  Monitor for respiratory toxicity while on IV narcotics  Repeat CBC in a.m. to monitor white count  Get chest x-ray concern of right infrahilar  infiltrate.  She denies cough, shortness of breath to me.  Check procalcitonin  Follow CT right shoulder  PT/OT evaluation

## 2024-05-04 NOTE — CARE PLAN
Problem: Pain - Standard  Goal: Alleviation of pain or a reduction in pain to the patient’s comfort goal  Description: Target End Date:  Prior to discharge or change in level of care    Document on Vitals flowsheet    1.  Document pain using the appropriate pain scale per order or unit policy  2.  Educate and implement non-pharmacologic comfort measures (i.e. relaxation, distraction, massage, cold/heat therapy, etc.)  3.  Pain management medications as ordered  4.  Reassess pain after pain med administration per policy  5.  If opiods administered assess patient's response to pain medication is appropriate per POSS sedation scale  6.  Follow pain management plan developed in collaboration with patient and interdisciplinary team (including palliative care or pain specialists if applicable)  Outcome: Progressing     Problem: Knowledge Deficit - Standard  Goal: Patient and family/care givers will demonstrate understanding of plan of care, disease process/condition, diagnostic tests and medications  Description: Target End Date:  1-3 days or as soon as patient condition allows    Document in Patient Education    1.  Patient and family/caregiver oriented to unit, equipment, visitation policy and means for communicating concern  2.  Complete/review Learning Assessment  3.  Assess knowledge level of disease process/condition, treatment plan, diagnostic tests and medications  4.  Explain disease process/condition, treatment plan, diagnostic tests and medications  Outcome: Met   The patient is Stable - Low risk of patient condition declining or worsening         Progress made toward(s) clinical / shift goals:    Pt. Vital signs WDL.   Pt. Verbalized understanding on the care provided.   Pt. Rated pain 5from 1-10, 10 being the most painful.

## 2024-05-04 NOTE — CONSULTS
"5/4/2024    The patient was seen at the request of Dr Montgomery    HPI: Shira Hollins is a 78 y.o. female who presents with complaints of pain to right hip.  This started yesterday after fall.  The pain is 5/10 and is described as sharp.  The pain is made worse by palpation of the area and made better by rest and immobilization.    History reviewed. No pertinent past medical history.    History reviewed. No pertinent surgical history.    Medications  No current facility-administered medications on file prior to encounter.     Current Outpatient Medications on File Prior to Encounter   Medication Sig Dispense Refill    aspirin (ASA) 81 MG Chew Tab chewable tablet Chew 81 mg 1 time a day as needed for Mild Pain.         Allergies  Patient has no known allergies.    ROS  Right hip pain. All other systems were reviewed and found to be negative    Family History   Problem Relation Age of Onset    No Known Problems Mother     No Known Problems Father     No Known Problems Sister        Social History     Socioeconomic History    Marital status: Single   Tobacco Use    Smoking status: Never    Smokeless tobacco: Never   Vaping Use    Vaping Use: Never used   Substance and Sexual Activity    Alcohol use: No    Drug use: No    Sexual activity: Never       Physical Exam  Vitals  /66   Pulse 99   Temp 37.1 °C (98.8 °F) (Temporal)   Resp 17   Ht 1.626 m (5' 4\")   Wt 59 kg (130 lb)   SpO2 93%   General: Well Developed, Well Nourished, Age appropriate appearance  HEENT: Normocephalic, atraumatic  Psych: Normal mood and affect  Neck: Supple, nontender, no masses  Lungs: Breathing unlabored, No audible wheezing  Heart: Regular heart rate and rhythm  Abdomen: Soft, NT, ND  Neuro: Sensation grossly intact to BUE and BLE, moving all four extremities  Skin: Intact, no open wounds  Vascular: 2+DP/PT, Capillary refill <2 seconds  MSK: Right hip pain, LLE      Radiographs:  Right femoral neck fracture  DX-CHEST-LIMITED (1 VIEW) "   Final Result         No acute cardiac or pulmonary abnormality is identified.      CT-SHOULDER W/O PLUS RECONS RIGHT   Final Result      1.  No fracture or dislocation of RIGHT shoulder.   2.  Mild degenerative changes.      CT-HEAD W/O   Final Result         1.  No acute intracranial abnormality is identified, there are nonspecific white matter changes, commonly associated with small vessel ischemic disease.  Associated mild cerebral atrophy is noted.   2.  Mild bilateral ventricular dilatation, could represent ex vacuo changes, consider component of normal pressure hydrocephalus with additional workup as clinically appropriate   3.  Atherosclerosis.         DX-HIP-COMPLETE - UNILATERAL 2+ RIGHT   Final Result         1.  Impacted subcapital right femoral neck fracture.      DX-SHOULDER 2+ RIGHT   Final Result         1.  No acute traumatic bony injury.   2.  Right infrahilar infiltrate             Laboratory Values  Recent Labs     05/04/24  0654   WBC 12.2*   RBC 4.02*   HEMOGLOBIN 12.2   HEMATOCRIT 36.0*   MCV 89.6   MCH 30.3   MCHC 33.9   RDW 46.0   PLATELETCT 169   MPV 10.4     Recent Labs     05/04/24  0412 05/04/24  0654   SODIUM 141  --    POTASSIUM 3.7  --    CHLORIDE 107  --    CO2 22  --    GLUCOSE 150*  --    BUN 29*  --    CPKTOTAL  --  213*             Impression:Right femoral neck fracture    Plan:We discussed the diagnosis and findings with the patient at length.  We reviewed possible non operative and operative interventions and the risks and benefits of each of these.  she had a chance to ask questions and all of these were answered to her satisfaction. The patient chose to proceed with  operative fixation including all indicated procedures. Risks and benefits of surgery were discussed which include but are not limited to bleeding, infection, neurovascular damage, malunion, nonunion, instability, limb length discrepancy, need for conversion to arthroplasty, DVT, PE, MI, Stroke and death. They  understand these risks and wish to proceed.    Surgical treatment of hip fractures is urgent as delay in intervention can increase the risk of neurovascular injury, progressive soft tissue injury, compartment syndrome, infection, malunion, nonunion, loss of motion, DVT and death.

## 2024-05-04 NOTE — OP REPORT
DATE OF OPERATION: 5/4/2024     PREOPERATIVE DIAGNOSIS: Right displaced femoral neck fracture    POSTOPERATIVE DIAGNOSIS: Same    PROCEDURE PERFORMED: Percutaneous fixation of femoral fracture, proximal end, neck    SURGEON: David Viera M.D.     ASSISTANT: None    ANESTHESIOLOGIST: Thelma    ANESTHESIA: General    ESTIMATED BLOOD LOSS: 5 mL    INDICATIONS: The patient is a 78 y.o. female with a right femoral neck fracture resulting from a ground level fall.  The patient denies antecedent pain, and was found to have a normal neurovascular exam and skin envelope.  Radiographs reviewed by myself demonstrated the femoral neck fracture.  Given these findings, surgical treatment of the femoral neck fracture with percutaneous screws was indicated.  I discussed the risks and benefits of the procedure, including the risks of infection, wound healing complication, neurovascular injury, persistent hip pain, malunion, non-union, malrotation, need for conversion to arthroplasty and the medical risks of anesthesia including DVT, PE, MI, stroke, and death.  Benefits include early mobilization, improved chance of union, and reduction in the medical risks of hip fractures.  Alternatives to surgery were also discussed, including non-operative management.  The patient signed the informed consent and the operative extremity was marked.      PROCEDURE:  The patient underwent anesthesia, and was positioned supine on the fracture table and all bony prominences were well padded.  Preoperative antibiotics were administered. Sequential compression devices were employed.  Preoperative imaging was obtained and demonstrated reduction of the fracture using the table. The correct operative site was prepped and draped into a sterile field. A procedural pause was conducted to verify correct patient, correct extremity, presence of the surgeons initials on the operative extremity.    Three guide pins for OIC 7.0 cannulated screws were inserted  in an inverted triangular fashion and 3 screws were inserted. Each had good bite and was within bone and out of the joint on all views. Good reduction was obtained.    All wounds were irrigated  with normal saline, and closed in layers, with 2-0 Vicryl in the subcutaneous tissue, and staples in the skin.  Sterile dressings were applied.       The patient tolerated the procedure well. There were no apparent complications. All sponge, needle, and instrument counts were correct on two separate occasions. They were awakened, extubated, and transferred to the recovery room in satisfactory   condition.       Post-Operative Plan:    1.  The patient should bear weight as tolerated on their operative extremity.  Gait aids (crutch or crutches, cane, walker) may be used as needed, and may be discontinued when no longer required.  2.  IV antibiotics - may be continued for 24 hours  3.  DVT prophylaxis - SCD's and Lovenox 40 mg SQ daily while inpatient.  The patient may transition to Aspirin 325 mg PO BID as an outpatient  4.  Discharge planning  ____________________________________   David Viera M.D.   DD: 5/4/2024  3:27 PM

## 2024-05-04 NOTE — ED TRIAGE NOTES
"Chief Complaint   Patient presents with    Shoulder Pain     R shoulder pain post GLF     Hip Pain     R hip pain post GLF     T-5000 GLF     Slipped off edge of bed. Denies head strike, -LOC, -thinners. C/o R hip and R shoulder pain after fall. CMS intact.      BIBA from hotel for GLF from bed. Pt denies etoh or other drug use tonight, denies feeling lightheaded or dizziness prior to fall.     Medications given: 19mg ketamine     BP (!) 173/79   Pulse 60   Temp 36.8 °C (98.3 °F) (Temporal)   Resp 17   Ht 1.626 m (5' 4\")   Wt 59 kg (130 lb)   SpO2 97%   BMI 22.31 kg/m²       "

## 2024-05-04 NOTE — ED NOTES
Pt medicated for pain per MAR, verbalized understanding of medication education provided. Blood collected and sent to lab for processing

## 2024-05-04 NOTE — ED NOTES
Pt reporting R shoulder pain, states difficulty moving extremity. Full ROM noted, CMS intact. Admitting MD to order CT scan of extremity, ok for pt to transfer off unit at this time.

## 2024-05-04 NOTE — OR NURSING
1523 - Patient arrived from OR via bed. Report received from Anesthesia and Nursing staff. Vitals stable, no distress noted, orders reviewed and released.     1644 - Patient transported to room via bed on 10L [place] with 500 in O2 tank accompanied by RN. Vitals stable, no distress noted, belongings sent with patient. Family updated, Report given to MAEGAN Vargas.

## 2024-05-04 NOTE — THERAPY
Physical Therapy Contact Note    Patient Name: Shira Hollins  Age:  78 y.o., Sex:  female  Medical Record #: [id number]  Today's Date: 5/4/2024    Discussed missed therapy with RN       05/04/24 0901   Interdisciplinary Plan of Care Collaboration   Collaboration Comments HOLD, awaiting surgery for  Impacted subcapital right femoral neck fracture. PT to complete eval once cleared for OOB after surgery.

## 2024-05-05 PROBLEM — D72.829 LEUCOCYTOSIS: Status: ACTIVE | Noted: 2024-05-05

## 2024-05-05 LAB
ANION GAP SERPL CALC-SCNC: 10 MMOL/L (ref 7–16)
APPEARANCE UR: ABNORMAL
BACTERIA #/AREA URNS HPF: ABNORMAL /HPF
BASOPHILS # BLD AUTO: 0.1 % (ref 0–1.8)
BASOPHILS # BLD: 0.01 K/UL (ref 0–0.12)
BILIRUB UR QL STRIP.AUTO: NEGATIVE
BUN SERPL-MCNC: 19 MG/DL (ref 8–22)
CALCIUM SERPL-MCNC: 8.8 MG/DL (ref 8.5–10.5)
CHLORIDE SERPL-SCNC: 107 MMOL/L (ref 96–112)
CO2 SERPL-SCNC: 23 MMOL/L (ref 20–33)
COLOR UR: YELLOW
CREAT SERPL-MCNC: 0.57 MG/DL (ref 0.5–1.4)
EOSINOPHIL # BLD AUTO: 0 K/UL (ref 0–0.51)
EOSINOPHIL NFR BLD: 0 % (ref 0–6.9)
EPI CELLS #/AREA URNS HPF: NEGATIVE /HPF
ERYTHROCYTE [DISTWIDTH] IN BLOOD BY AUTOMATED COUNT: 47.8 FL (ref 35.9–50)
GFR SERPLBLD CREATININE-BSD FMLA CKD-EPI: 91 ML/MIN/1.73 M 2
GLUCOSE SERPL-MCNC: 143 MG/DL (ref 65–99)
GLUCOSE UR STRIP.AUTO-MCNC: NEGATIVE MG/DL
HCT VFR BLD AUTO: 38.1 % (ref 37–47)
HGB BLD-MCNC: 12.2 G/DL (ref 12–16)
HYALINE CASTS #/AREA URNS LPF: ABNORMAL /LPF
IMM GRANULOCYTES # BLD AUTO: 0.03 K/UL (ref 0–0.11)
IMM GRANULOCYTES NFR BLD AUTO: 0.2 % (ref 0–0.9)
KETONES UR STRIP.AUTO-MCNC: NEGATIVE MG/DL
LEUKOCYTE ESTERASE UR QL STRIP.AUTO: ABNORMAL
LYMPHOCYTES # BLD AUTO: 0.31 K/UL (ref 1–4.8)
LYMPHOCYTES NFR BLD: 2.4 % (ref 22–41)
MAGNESIUM SERPL-MCNC: 1.9 MG/DL (ref 1.5–2.5)
MCH RBC QN AUTO: 29.5 PG (ref 27–33)
MCHC RBC AUTO-ENTMCNC: 32 G/DL (ref 32.2–35.5)
MCV RBC AUTO: 92.3 FL (ref 81.4–97.8)
MICRO URNS: ABNORMAL
MONOCYTES # BLD AUTO: 0.67 K/UL (ref 0–0.85)
MONOCYTES NFR BLD AUTO: 5.2 % (ref 0–13.4)
NEUTROPHILS # BLD AUTO: 11.87 K/UL (ref 1.82–7.42)
NEUTROPHILS NFR BLD: 92.1 % (ref 44–72)
NITRITE UR QL STRIP.AUTO: POSITIVE
NRBC # BLD AUTO: 0 K/UL
NRBC BLD-RTO: 0 /100 WBC (ref 0–0.2)
PH UR STRIP.AUTO: 6 [PH] (ref 5–8)
PLATELET # BLD AUTO: 166 K/UL (ref 164–446)
PMV BLD AUTO: 10.6 FL (ref 9–12.9)
POTASSIUM SERPL-SCNC: 3.5 MMOL/L (ref 3.6–5.5)
PROT UR QL STRIP: 100 MG/DL
RBC # BLD AUTO: 4.13 M/UL (ref 4.2–5.4)
RBC # URNS HPF: ABNORMAL /HPF
RBC UR QL AUTO: ABNORMAL
SODIUM SERPL-SCNC: 140 MMOL/L (ref 135–145)
SP GR UR STRIP.AUTO: >=1.03
UROBILINOGEN UR STRIP.AUTO-MCNC: 0.2 MG/DL
WBC # BLD AUTO: 12.9 K/UL (ref 4.8–10.8)
WBC #/AREA URNS HPF: ABNORMAL /HPF

## 2024-05-05 PROCEDURE — 99223 1ST HOSP IP/OBS HIGH 75: CPT | Performed by: PHYSICAL MEDICINE & REHABILITATION

## 2024-05-05 PROCEDURE — 99233 SBSQ HOSP IP/OBS HIGH 50: CPT | Performed by: STUDENT IN AN ORGANIZED HEALTH CARE EDUCATION/TRAINING PROGRAM

## 2024-05-05 RX ORDER — NITROFURANTOIN 25; 75 MG/1; MG/1
100 CAPSULE ORAL 2 TIMES DAILY WITH MEALS
Status: DISCONTINUED | OUTPATIENT
Start: 2024-05-05 | End: 2024-05-07 | Stop reason: HOSPADM

## 2024-05-05 RX ORDER — AMLODIPINE BESYLATE 5 MG/1
5 TABLET ORAL
Status: DISCONTINUED | OUTPATIENT
Start: 2024-05-05 | End: 2024-05-07

## 2024-05-05 RX ORDER — HALOPERIDOL 5 MG/ML
1 INJECTION INTRAMUSCULAR EVERY 6 HOURS PRN
Status: DISCONTINUED | OUTPATIENT
Start: 2024-05-05 | End: 2024-05-07 | Stop reason: HOSPADM

## 2024-05-05 RX ADMIN — DOCUSATE SODIUM 100 MG: 100 CAPSULE, LIQUID FILLED ORAL at 18:03

## 2024-05-05 RX ADMIN — OMEPRAZOLE 20 MG: 20 CAPSULE, DELAYED RELEASE ORAL at 04:39

## 2024-05-05 RX ADMIN — HALOPERIDOL LACTATE 1 MG: 5 INJECTION, SOLUTION INTRAMUSCULAR at 06:49

## 2024-05-05 RX ADMIN — KETOROLAC TROMETHAMINE 15 MG: 15 INJECTION, SOLUTION INTRAMUSCULAR; INTRAVENOUS at 18:04

## 2024-05-05 RX ADMIN — Medication 1000 UNITS: at 04:38

## 2024-05-05 RX ADMIN — KETOROLAC TROMETHAMINE 15 MG: 15 INJECTION, SOLUTION INTRAMUSCULAR; INTRAVENOUS at 04:37

## 2024-05-05 RX ADMIN — KETOROLAC TROMETHAMINE 15 MG: 15 INJECTION, SOLUTION INTRAMUSCULAR; INTRAVENOUS at 23:38

## 2024-05-05 RX ADMIN — ACETAMINOPHEN 650 MG: 325 TABLET, FILM COATED ORAL at 18:03

## 2024-05-05 RX ADMIN — OXYCODONE 10 MG: 5 TABLET ORAL at 07:30

## 2024-05-05 RX ADMIN — SENNOSIDES AND DOCUSATE SODIUM 1 TABLET: 50; 8.6 TABLET ORAL at 20:28

## 2024-05-05 RX ADMIN — ENOXAPARIN SODIUM 40 MG: 100 INJECTION SUBCUTANEOUS at 03:36

## 2024-05-05 RX ADMIN — ACETAMINOPHEN 650 MG: 325 TABLET, FILM COATED ORAL at 04:38

## 2024-05-05 RX ADMIN — NITROFURANTOIN MONOHYDRATE/MACROCRYSTALS 100 MG: 75; 25 CAPSULE ORAL at 18:03

## 2024-05-05 RX ADMIN — AMLODIPINE BESYLATE 5 MG: 5 TABLET ORAL at 18:03

## 2024-05-05 RX ADMIN — LIDOCAINE 1 PATCH: 4 PATCH TOPICAL at 04:39

## 2024-05-05 RX ADMIN — OMEPRAZOLE 20 MG: 20 CAPSULE, DELAYED RELEASE ORAL at 18:04

## 2024-05-05 RX ADMIN — KETOROLAC TROMETHAMINE 15 MG: 15 INJECTION, SOLUTION INTRAMUSCULAR; INTRAVENOUS at 13:25

## 2024-05-05 RX ADMIN — DOCUSATE SODIUM 100 MG: 100 CAPSULE, LIQUID FILLED ORAL at 04:38

## 2024-05-05 RX ADMIN — ACETAMINOPHEN 650 MG: 325 TABLET, FILM COATED ORAL at 13:26

## 2024-05-05 ASSESSMENT — GAIT ASSESSMENTS
ASSISTIVE DEVICE: FRONT WHEEL WALKER
DISTANCE (FEET): 25
GAIT LEVEL OF ASSIST: CONTACT GUARD ASSIST

## 2024-05-05 ASSESSMENT — ENCOUNTER SYMPTOMS: FALLS: 1

## 2024-05-05 ASSESSMENT — COGNITIVE AND FUNCTIONAL STATUS - GENERAL
STANDING UP FROM CHAIR USING ARMS: A LITTLE
DRESSING REGULAR LOWER BODY CLOTHING: A LOT
MOVING FROM LYING ON BACK TO SITTING ON SIDE OF FLAT BED: A LITTLE
DRESSING REGULAR UPPER BODY CLOTHING: A LITTLE
PERSONAL GROOMING: A LITTLE
WALKING IN HOSPITAL ROOM: A LITTLE
DAILY ACTIVITIY SCORE: 16
HELP NEEDED FOR BATHING: A LOT
TOILETING: A LOT
SUGGESTED CMS G CODE MODIFIER MOBILITY: CK
MOVING TO AND FROM BED TO CHAIR: A LITTLE
MOBILITY SCORE: 17
TURNING FROM BACK TO SIDE WHILE IN FLAT BAD: A LITTLE
SUGGESTED CMS G CODE MODIFIER DAILY ACTIVITY: CK
CLIMB 3 TO 5 STEPS WITH RAILING: A LOT

## 2024-05-05 ASSESSMENT — PAIN DESCRIPTION - PAIN TYPE
TYPE: ACUTE PAIN;SURGICAL PAIN
TYPE: ACUTE PAIN
TYPE: SURGICAL PAIN
TYPE: SURGICAL PAIN

## 2024-05-05 ASSESSMENT — ACTIVITIES OF DAILY LIVING (ADL): TOILETING: INDEPENDENT

## 2024-05-05 NOTE — CARE PLAN
The patient is Stable - Low risk of patient condition declining or worsening    Shift Goals  Clinical Goals: Pain control; PT OT  Patient Goals: rest  Family Goals: NA    Progress made toward(s) clinical / shift goals:  Educated patient on pain rating scales, frequent pain assessments in place, medicating per MAR, non pharmaceutical pain relief such as heat pads and positioning in use.        Patient is not progressing towards the following goals:

## 2024-05-05 NOTE — THERAPY
"Physical Therapy   Initial Evaluation     Patient Name: Shira Hollins  Age:  78 y.o., Sex:  female  Medical Record #: [id number]  Today's Date: 5/5/2024     Precautions: Fall Risk; Weight Bearing As Tolerated Right Lower Extremity    Assessment  Patient is 78 y.o. female POD #1 R femoral fx percutaneous fixation. Pt A&O x2, states she believes she is at her extended stay motel and when asked why she is at the hospital she stated \"personal reasons\". Re-oriented pt and informed her of surgery however she was preservative on not being able to see scar under bandage. Pt required min A primarily for RLE negotiate OOB. She c/o R shoulder pain with R shoulder flexion limited to 90 degrees and difficulty using RUE to push herself to standing. Able to amb 25ft in room with FWW and CGA. Of note, pt to have urinary incontinence and urine very cloudy/white, informed RN. Anticipate need for placement prior to DC home given pt lives alone per her report without assistance. PT to follow. Encourage up to chair 3x/day.    Plan    Physical Therapy Initial Treatment Plan   Treatment Plan : Bed Mobility, Equipment, Gait Training, Neuro Re-Education / Balance, Self Care / Home Evaluation, Stair Training, Therapeutic Exercise, Therapeutic Activities  Treatment Frequency: 5 Times per Week  Duration: Until Therapy Goals Met    DC Equipment Recommendations: Unable to determine at this time  Discharge Recommendations: Recommend post-acute placement for additional physical therapy services prior to discharge home     Objective      Prior Living Situation   Prior Services Unable To Determine At This Time   Housing / Facility Motel  (extended stay motel? pt also reports she has a home that she cannot stay in after late 's passing)   Equipment Owned Unable to Determine At This Time   Lives with - Patient's Self Care Capacity Alone and Unable to Care For Self   Comments reports dtr visits her 3x/wk but that son moved away, pt is poor " "historian and will need to comfirm home support with family   Prior Level of Functional Mobility   Comments Unable to determine PLOF due to pts cognition at this time, however assume ind if she was primarily living by herself.   History of Falls   History of Falls Yes   Date of Last Fall   (reason for admission, fell OOB)   Cognition    Cognition / Consciousness X   Orientation Level Not Oriented to Reason;Not Oriented to Place   Level of Consciousness   (lethargic)   New Learning Impaired   Attention Impaired   Sequencing Impaired   Comments pt dosing off while sitting EOB upon arrival, she is A&O x2 (aware of self and date) but believes this is her home and says \"it's personal\" when asked why she is in the hospital, pt perseverative on looking for scar and nail in her leg after she was told she had surgery, urine noted to be extremely cloudy- RN notified, RN reports dementia at baseline   Active ROM Upper Body   Active ROM Upper Body  X   Comments RUE flexion limited by pain   Strength Upper Body   Upper Body Strength  X   Comments LUE WFL, R shoulder flexion limited 2/5, difficulty using RUE to push off surfaces for STS   Sensation Upper Body   Upper Extremity Sensation  WDL   Comments denies N/T   Active ROM Lower Body    Active ROM Lower Body  X   Comments R hip limited by pain/swelling post-op   Strength Lower Body   Lower Body Strength  X   Comments as above, LLE WFL and R hip limited post-op but good ability to accept WB   Sensation Lower Body   Lower Extremity Sensation   WDL   Comments denies N/T   Neurological Concerns   Neurological Concerns Yes   Comments given impaired cognition   Balance Assessment   Sitting Balance (Static) Fair   Sitting Balance (Dynamic) Fair -   Standing Balance (Static) Fair -   Standing Balance (Dynamic) Poor +   Weight Shift Sitting Fair   Weight Shift Standing Poor   Comments w/ FWW   Bed Mobility    Supine to Sit Minimal Assist   Sit to Supine   (up in recliner post) "   Scooting Contact Guard Assist   Gait Analysis   Gait Level Of Assist Contact Guard Assist   Assistive Device Front Wheel Walker   Distance (Feet) 25   # of Times Distance was Traveled 1   Deviation Decreased Toe Off;Decreased Heel Strike;Antalgic;Step To   # of Stairs Climbed 0   Weight Bearing Status WBAT RLE   Functional Mobility   Sit to Stand Minimal Assist  (mod A from toilet due to poor push through RUE)   Bed, Chair, Wheelchair Transfer Minimal Assist   Transfer Method Stand Step   6 Clicks Assessment - How much HELP from from another person do you currently need... (If the patient hasn't done an activity recently, how much help from another person do you think he/she would need if he/she tried?)   Turning from your back to your side while in a flat bed without using bedrails? 3   Moving from lying on your back to sitting on the side of a flat bed without using bedrails? 3   Moving to and from a bed to a chair (including a wheelchair)? 3   Standing up from a chair using your arms (e.g., wheelchair, or bedside chair)? 3   Walking in hospital room? 3   Climbing 3-5 steps with a railing? 2   6 clicks Mobility Score 17   Activity Tolerance   Sitting in Chair post session   Sitting Edge of Bed 5 mins   Standing 10 mins total   Short Term Goals    Short Term Goal # 1 Pt will perform supine<>sit with HOB flat and SPV within 6 visits.   Short Term Goal # 2 Pt will perform STS with FWW and SPV within 6 visits.   Short Term Goal # 3 Pt will amb 150ft with FWW and SPV within 6 visits.   Short Term Goal # 4 Pt will ascend/descen1 step with FWW and SPV within 6 visits.   Education Group   Education Provided Role of Physical Therapist;Weight Bearing Status;Gait Training   Role of Physical Therapist Patient Response Patient;Acceptance;Explanation;Demonstration;Action Demonstration   Gait Training Patient Response Patient;Acceptance;Explanation;Reinforcement Needed   Weight Bearing Status Patient Response  Patient;Acceptance;Explanation;Reinforcement Needed;Action Demonstration
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
## 2024-05-05 NOTE — PROGRESS NOTES
"      Orthopaedic Progress Note    Interval changes:  Patient doing well post op  RLE dressings are CDI  Cleared for DC to SNF by ortho pending medicine clearance    ROS - Patient denies any new issues.  Pain well controlled.    BP (!) 158/75   Pulse 65   Temp 36.7 °C (98.1 °F) (Temporal)   Resp 16   Ht 1.626 m (5' 4\")   Wt 59 kg (130 lb)   SpO2 97%     Patient seen and examined  No acute distress  Breathing non labored  RRR  RLE surgical dressing is clean, dry, and intact. Patient clearly fires tibialis anterior, EHL, and gastrocnemius/soleus. Sensation is intact to light touch throughout superficial peroneal, deep peroneal, tibial, saphenous, and sural nerve distributions. Strong and palpable 2+ dorsalis pedis and posterior tibial pulses with capillary refill less than 2 seconds. No lower leg tenderness or discomfort.     Recent Labs     05/04/24  0654 05/05/24  0751   WBC 12.2* 12.9*   RBC 4.02* 4.13*   HEMOGLOBIN 12.2 12.2   HEMATOCRIT 36.0* 38.1   MCV 89.6 92.3   MCH 30.3 29.5   MCHC 33.9 32.0*   RDW 46.0 47.8   PLATELETCT 169 166   MPV 10.4 10.6       Active Hospital Problems    Diagnosis     Hip fracture requiring operative repair, right, closed, initial encounter ([facility]) [S72.001A]     Acute pain of right shoulder [M25.511]     Hypertension [I10]     Hyperglycemia [R73.9]     Dehydration [E86.0]     ACP (advance care planning) [Z71.89]        Assessment/Plan:  Patient doing well post op  RLE dressings are CDI  Cleared for DC to SNF by ortho pending medicine clearance  POD#1 S/P Percutaneous fixation of femoral fracture, proximal end, neck  Wt bearing status - WBAT  Wound care/Drains - Dressings to be changed every other day by nursing. Or PRN for saturation starting POD#2  Future Procedures - none planned   Lovenox: Start 5/5, Duration-until ambulatory > 150'  Sutures/Staples out- 14-21 days post operatively. Removal will completed by ortho mid levels only.  PT/OT-initiated  Antibiotics: Perioperative " completed  DVT Prophylaxis- TEDS/SCDs/Foot pumps  Mcgrath-not needed per ortho  Case Coordination for Discharge Planning - Disposition per therapy recs.

## 2024-05-05 NOTE — PROGRESS NOTES
Patient refused to place new iv in. Texted Zarina MCCRACKEN about it. Will talk to patient in a few hours.  0327 Patient agreed to have new IV in. New iv placed.  Patient more confused over the night.

## 2024-05-05 NOTE — CARE PLAN
The patient is Stable - Low risk of patient condition declining or worsening    Shift Goals  Clinical Goals: PT/OT,rest  Patient Goals: rest  Family Goals: NA    Progress made toward(s) clinical / shift goals:  yes  Problem: Pain - Standard  Goal: Alleviation of pain or a reduction in pain to the patient’s comfort goal  Outcome: Progressing     Problem: Respiratory  Goal: Patient will achieve/maintain optimum respiratory ventilation and gas exchange  Outcome: Progressing     Problem: Skin Integrity  Goal: Skin integrity is maintained or improved  Outcome: Progressing     Problem: Fall Risk  Goal: Patient will remain free from falls  Outcome: Progressing       Patient is not progressing towards the following goals:

## 2024-05-05 NOTE — THERAPY
Occupational Therapy   Initial Evaluation     Patient Name: Shira Hollins  Age:  78 y.o., Sex:  female  Medical Record #: [id number]  Today's Date: 5/5/2024    Precautions: Fall Risk, Weight Bearing As Tolerated Right Lower Extremity    Assessment  Patient is 78 y.o. female admitted after a GLF which resulted in right shoulder pain and a right displaced femoral neck fx. Pt seen s/p percutaneous fixation. Imaging of right shoulder was negative for any acute injuries. PMHx of HTN, hyperglycemia, and PTSD. Pt seen for OT eval and tx. Pt currently resides alone in a hotel room at the [facility] and was independent with ADLs and most IADLs. Pt's daughter present during eval and reported that she provides assist with IADLs such as grocery shopping, rides, and finances.     During OT eval, pt presented with pain as well as deficits in self-care tasks, balance, functional mobility, cognition, strength, ROM, and activity tolerance. She required min-mod A to complete ADLs and txfs using FWW. Pt was pleasantly confused during session, but was easily distracted by environmental stimuli, difficult to redirect, and demo difficulty following multi-step directions necessitating increased multi-modal cues. Pt was able to participate in formal UE AROM testing where she could complete ~160 degrees of right shoulder flexion/abduction prior to reporting an increase in pain. But, when asked to place right hand onto FWW to assist with txf BTB pt reported that she could not do so due to pain in her right shoulder and preferred to keep it in a protected/guarded position. Pt's daughter reported that pt has dementia and that they have been trying to find a more supportive housing situation than what she currently has as she is needing more assistance with ADLs/IADLs. But pt is hesitant due to fear of giving up her independence.  Discussed with pt and pt's family recommendation of post-acute placement prior to DC home; all are  receptive at time of discussion. Will continue to follow for ongoing acute OT services.     Plan    Occupational Therapy Initial Treatment Plan   Treatment Interventions: Self Care / Activities of Daily Living, Adaptive Equipment, Therapeutic Exercises, Therapeutic Activity  Treatment Frequency: 5 Times per Week  Duration: Until Therapy Goals Met    DC Equipment Recommendations: Unable to determine at this time  Discharge Recommendations: Recommend post-acute placement for additional occupational therapy services prior to discharge home      Objective      Prior Living Situation   Prior Services Home-Independent   Housing / Facility Motel  ([facility]   Bathroom Set up Bathtub / Shower Combination   Equipment Owned None   Lives with - Patient's Self Care Capacity Alone and Unable to Care For Self   Comments Pt currently resides at [facility] off of [facility]. Pt has resided there for the past 6 years. Prior to that pt was living in a 1-story house. Pt's daughter present during eval and confirmed home set-up. Pt's daughter states that she tries to visit her mom at least 2x/week and assists with grocery shopping, rides, and finances. Daughter reports that the pt has Dementia and they have been trying to find alternative placements for her, but the pt refuses due to fear of giving up her independence. They are receptive to placement and are planning to use that time to find a more supportive environment for the pt to DC to. Pt's daughter is unsure how many previous falls the pt has had and endorsed no AD use.   Prior Level of ADL Function   Self Feeding Independent   Grooming / Hygiene Independent   Bathing Independent   Dressing Independent   Toileting Independent   Prior Level of IADL Function   Medication Management Independent   Laundry Independent   Kitchen Mobility Requires Assist   Finances Requires Assist   Home Management Requires Assist   Shopping Requires Assist    Prior Level Of Mobility Independent Without Device in Community;Independent Without Device in Home   Driving / Transportation Relatives / Others Provide Transportation   History of Falls   History of Falls Yes   Date of Last Fall   (Related to admit)   Precautions   Precautions Fall Risk;Weight Bearing As Tolerated Right Lower Extremity   Vitals   O2 (LPM) 1   O2 Delivery Device Nasal Cannula   Vitals Comments Pt reported lightheadedness when attempting to complete STS from recliner. Attempted to take BP, but demo difficulty taking accurate reading. Pt reported that symptoms had begun to resolve with time   Pain 0 - 10 Group   Therapist Pain Assessment Post Activity Pain Same as Prior to Activity;Nurse Notified  (Not rated, reported an increase in RUE and RLE pain with activitiy)   Cognition    Cognition / Consciousness X   Orientation Level Not Oriented to Reason;Not Oriented to Place   Level of Consciousness Alert   Ability To Follow Commands 1 Step   New Learning Impaired   Attention Impaired   Sequencing Impaired   Comments Pleasantly confused during session and frequently asking repetitive questions. Per daughter, pt has a hx of dementia. Easily distracted by environmental stimuli, difficult to redirect, and difficulty following multi-step directions necessitating increased multi-modal cues.   Active ROM Upper Body   Active ROM Upper Body  X   Dominant Hand Left   Comments LUE WFL; distal RUE WDL; R shoulder flexion/abduction limited due to pain (0-160 degrees). After completing formal evaluation of RUE movement, when asked to place R hand on FWW to assist with txf back to bed pt reported that she could not due to difficulty moving her right arm because of shoulder pain.    Strength Upper Body   Upper Body Strength  X   Comments LUE WDL; Unable to use RUE to push off surfaces due to an increase in pain   Coordination Upper Body   Coordination WDL   Balance Assessment   Sitting Balance (Static) Fair   Sitting  Balance (Dynamic) Fair -   Standing Balance (Static) Fair -   Standing Balance (Dynamic) Poor +   Weight Shift Sitting Fair   Weight Shift Standing Poor   Comments w/FWW   Bed Mobility    Supine to Sit   (Up to chair pre)   Sit to Supine Contact Guard Assist   Scooting Contact Guard Assist   Comments HOB flat   ADL Assessment   Eating Supervision   Grooming Supervision;Seated  (Face washing; reported that she had completed oral care prior to start of session)   Lower Body Dressing Moderate Assist  (Demo abilit to tailor sit to doff/don L sock; training on use of AE to doff/don R sock)   Toileting   (NT; declined need during session.)   How much help from another person does the patient currently need...   6 Clicks Daily Activity Score 16   Functional Mobility   Sit to Stand Minimal Assist   Bed, Chair, Wheelchair Transfer Minimal Assist   Toilet Transfers   (NT; declined need during session)   Mobility Chair > EOB   Comments Required cues for hand placement when completeing STS   Activity Tolerance   Sitting in Chair Up to chair pre   Sitting Edge of Bed 4 min   Standing 2 min   Comments Limtied by pain   Patient / Family Goals   Patient / Family Goal #1 To go home   Short Term Goals   Short Term Goal # 1 Pt will complete ADL txfs with supv   Short Term Goal # 2 Pt will complete LB dressing with supv using AE PRN   Short Term Goal # 3 Pt will complete standing g/h routine with supv   Short Term Goal # 4 Pt will complete toileting ADLs with supv   Education Group   Education Provided Role of Occupational Therapist;Activities of Daily Living;Pathology of bedrest;Weight Bearing Precautions   Role of Occupational Therapist Patient Response Patient;Family;Acceptance;Explanation;Verbal Demonstration   ADL Patient Response Patient;Family;Acceptance;Explanation;Verbal Demonstration   Weight Bearing Precautions Patient Response Patient;Family;Acceptance;Explanation;Verbal Demonstration   Pathology of Bedrest Patient Response  Patient;Family;Acceptance;Explanation;Verbal Demonstration

## 2024-05-05 NOTE — PROGRESS NOTES
Patient not oriented to situation. Patient stated  she has little bit of pain. Meds administered per MAR.  Pure wic in place.  Water and call light within reach.

## 2024-05-05 NOTE — PROGRESS NOTES
Hospital Medicine Daily Progress Note    Date of Service  5/5/2024    Chief Complaint  Shira Hollins is a 78 y.o. female admitted 5/4/2024 with right hip pain    Hospital Course    Shira Hollins is a 78 y.o. female who presented 5/4/2024 with evaluation for ground-level fall. Noted to have impacted right subcapital femoral neck fracture.   S/p Percutaneous fixation of femoral fracture.  Interval Problem Update    5/5/2024  Seen and examined at bedside   Elevated blood pressure  On 1 L oxygen saturating 90%  Labs reviewed noted leukocytosis, hemoglobin 12.2, potassium 3.5, procalcitonin negative.  Chest x-ray unremarkable.  CT right shoulder with no fracture or dislocation    PT/OT recommending postacute placement  PMR consulted  Requiring IV narcotics for pain management, monitor for toxicity  Leukocytosis likely reactive.  Have ordered repeat CBC in a.m. to monitor white count  Case discussed with orthopedics KURT Morillo    I have discussed this patient's plan of care and discharge plan at IDT rounds today with Case Management, Nursing, Nursing leadership, and other members of the IDT team.    Consultants/Specialty  orthopedics    Code Status  Full Code    Disposition  The patient is not medically cleared for discharge to home or a post-acute facility.  Anticipate discharge to: skilled nursing facility    I have placed the appropriate orders for post-discharge needs.    Review of Systems  Review of Systems   Musculoskeletal:  Positive for falls and joint pain.        Physical Exam  Temp:  [36.1 °C (97 °F)-36.9 °C (98.4 °F)] 36.7 °C (98.1 °F)  Pulse:  [60-80] 65  Resp:  [12-20] 16  BP: (127-182)/() 158/75  SpO2:  [94 %-100 %] 97 %    Physical Exam  Constitutional:       Appearance: Normal appearance.   Cardiovascular:      Rate and Rhythm: Normal rate and regular rhythm.      Pulses: Normal pulses.      Heart sounds: Normal heart sounds.   Pulmonary:      Effort: Pulmonary effort is normal.   Abdominal:       General: There is no distension.   Musculoskeletal:      Comments: Range of motion limited in right hip   Neurological:      General: No focal deficit present.      Mental Status: She is alert and oriented to person, place, and time.   Psychiatric:         Mood and Affect: Mood normal.         Fluids  No intake or output data in the 24 hours ending 05/05/24 1543    Laboratory  Recent Labs     05/04/24  0654 05/05/24  0751   WBC 12.2* 12.9*   RBC 4.02* 4.13*   HEMOGLOBIN 12.2 12.2   HEMATOCRIT 36.0* 38.1   MCV 89.6 92.3   MCH 30.3 29.5   MCHC 33.9 32.0*   RDW 46.0 47.8   PLATELETCT 169 166   MPV 10.4 10.6     Recent Labs     05/04/24  0412 05/05/24  0751   SODIUM 141 140   POTASSIUM 3.7 3.5*   CHLORIDE 107 107   CO2 22 23   GLUCOSE 150* 143*   BUN 29* 19   CREATININE 0.56 0.57   CALCIUM 8.9 8.8                   Imaging  DX-CHEST-LIMITED (1 VIEW)   Final Result         No acute cardiac or pulmonary abnormality is identified.      CT-SHOULDER W/O PLUS RECONS RIGHT   Final Result      1.  No fracture or dislocation of RIGHT shoulder.   2.  Mild degenerative changes.      CT-HEAD W/O   Final Result         1.  No acute intracranial abnormality is identified, there are nonspecific white matter changes, commonly associated with small vessel ischemic disease.  Associated mild cerebral atrophy is noted.   2.  Mild bilateral ventricular dilatation, could represent ex vacuo changes, consider component of normal pressure hydrocephalus with additional workup as clinically appropriate   3.  Atherosclerosis.         DX-HIP-COMPLETE - UNILATERAL 2+ RIGHT   Final Result         1.  Impacted subcapital right femoral neck fracture.      DX-SHOULDER 2+ RIGHT   Final Result         1.  No acute traumatic bony injury.   2.  Right infrahilar infiltrate         DX-PORTABLE FLUORO > 1 HOUR    (Results Pending)   DX-HIP-COMPLETE - UNILATERAL 2+ RIGHT    (Results Pending)        Assessment/Plan  * Hip fracture requiring operative repair,  right, closed, initial encounter (HCC)- (present on admission)  Assessment & Plan  S/p Percutaneous fixation of femoral fracture.      PT/OT recommending postacute placement  PMR consulted  Requiring IV narcotics for pain management, monitor for toxicity  Case discussed with orthopedics KURT Morillo    Leucocytosis  Assessment & Plan  Leukocytosis likely reactive.  Procalcitonin negative, have ordered repeat CBC in a.m. to monitor white count    ACP (advance care planning)  Assessment & Plan  Full code    Dehydration  Assessment & Plan  IVF    Hyperglycemia  Assessment & Plan  Check HbA1c    Hypertension  Assessment & Plan  Added Norvasc 5 mg daily    Acute pain of right shoulder  Assessment & Plan  CT right shoulder with no fracture or dislocation  Pain improved         VTE prophylaxis: lovenox    I have performed a physical exam and reviewed and updated ROS and Plan today (5/5/2024). In review of yesterday's note (5/4/2024), there are no changes except as documented above.       Greater than 51 minutes spent preparing to see patient (e.g. review of tests) obtaining and/or reviewing separately obtained history. Performing a medically appropriate examination and/ evaluation.  Counseling and educating the patient/family/caregiver.  Ordering medications, tests, or procedures.  Referring and communicating with other health care professionals.  Documenting clinical information in EPIC.  Independently interpreting results and communicating results to patient/family/caregiver.  Care coordination.

## 2024-05-06 PROBLEM — N39.0 ACUTE UTI: Status: ACTIVE | Noted: 2024-05-06

## 2024-05-06 LAB
BASOPHILS # BLD AUTO: 0.3 % (ref 0–1.8)
BASOPHILS # BLD: 0.03 K/UL (ref 0–0.12)
EOSINOPHIL # BLD AUTO: 0.16 K/UL (ref 0–0.51)
EOSINOPHIL NFR BLD: 1.6 % (ref 0–6.9)
ERYTHROCYTE [DISTWIDTH] IN BLOOD BY AUTOMATED COUNT: 48.8 FL (ref 35.9–50)
HCT VFR BLD AUTO: 37.3 % (ref 37–47)
HGB BLD-MCNC: 11.9 G/DL (ref 12–16)
IMM GRANULOCYTES # BLD AUTO: 0.04 K/UL (ref 0–0.11)
IMM GRANULOCYTES NFR BLD AUTO: 0.4 % (ref 0–0.9)
LYMPHOCYTES # BLD AUTO: 0.66 K/UL (ref 1–4.8)
LYMPHOCYTES NFR BLD: 6.8 % (ref 22–41)
MCH RBC QN AUTO: 29.3 PG (ref 27–33)
MCHC RBC AUTO-ENTMCNC: 31.9 G/DL (ref 32.2–35.5)
MCV RBC AUTO: 91.9 FL (ref 81.4–97.8)
MONOCYTES # BLD AUTO: 0.51 K/UL (ref 0–0.85)
MONOCYTES NFR BLD AUTO: 5.2 % (ref 0–13.4)
NEUTROPHILS # BLD AUTO: 8.37 K/UL (ref 1.82–7.42)
NEUTROPHILS NFR BLD: 85.7 % (ref 44–72)
NRBC # BLD AUTO: 0 K/UL
NRBC BLD-RTO: 0 /100 WBC (ref 0–0.2)
PLATELET # BLD AUTO: 156 K/UL (ref 164–446)
PMV BLD AUTO: 10.5 FL (ref 9–12.9)
RBC # BLD AUTO: 4.06 M/UL (ref 4.2–5.4)
WBC # BLD AUTO: 9.8 K/UL (ref 4.8–10.8)

## 2024-05-06 PROCEDURE — 99233 SBSQ HOSP IP/OBS HIGH 50: CPT | Performed by: STUDENT IN AN ORGANIZED HEALTH CARE EDUCATION/TRAINING PROGRAM

## 2024-05-06 RX ADMIN — ACETAMINOPHEN 650 MG: 325 TABLET, FILM COATED ORAL at 06:08

## 2024-05-06 RX ADMIN — OMEPRAZOLE 20 MG: 20 CAPSULE, DELAYED RELEASE ORAL at 06:08

## 2024-05-06 RX ADMIN — Medication 1000 UNITS: at 06:08

## 2024-05-06 RX ADMIN — ENOXAPARIN SODIUM 40 MG: 100 INJECTION SUBCUTANEOUS at 06:07

## 2024-05-06 RX ADMIN — KETOROLAC TROMETHAMINE 15 MG: 15 INJECTION, SOLUTION INTRAMUSCULAR; INTRAVENOUS at 12:17

## 2024-05-06 RX ADMIN — ACETAMINOPHEN 650 MG: 325 TABLET, FILM COATED ORAL at 16:59

## 2024-05-06 RX ADMIN — SENNOSIDES AND DOCUSATE SODIUM 1 TABLET: 50; 8.6 TABLET ORAL at 22:58

## 2024-05-06 RX ADMIN — ACETAMINOPHEN 650 MG: 325 TABLET, FILM COATED ORAL at 12:17

## 2024-05-06 RX ADMIN — DOCUSATE SODIUM 100 MG: 100 CAPSULE, LIQUID FILLED ORAL at 06:08

## 2024-05-06 RX ADMIN — KETOROLAC TROMETHAMINE 15 MG: 15 INJECTION, SOLUTION INTRAMUSCULAR; INTRAVENOUS at 06:09

## 2024-05-06 RX ADMIN — KETOROLAC TROMETHAMINE 15 MG: 15 INJECTION, SOLUTION INTRAMUSCULAR; INTRAVENOUS at 17:00

## 2024-05-06 RX ADMIN — AMLODIPINE BESYLATE 5 MG: 5 TABLET ORAL at 06:08

## 2024-05-06 RX ADMIN — DOCUSATE SODIUM 100 MG: 100 CAPSULE, LIQUID FILLED ORAL at 17:00

## 2024-05-06 RX ADMIN — NITROFURANTOIN MONOHYDRATE/MACROCRYSTALS 100 MG: 75; 25 CAPSULE ORAL at 09:30

## 2024-05-06 RX ADMIN — LIDOCAINE 1 PATCH: 4 PATCH TOPICAL at 06:13

## 2024-05-06 RX ADMIN — NITROFURANTOIN MONOHYDRATE/MACROCRYSTALS 100 MG: 75; 25 CAPSULE ORAL at 16:59

## 2024-05-06 ASSESSMENT — COGNITIVE AND FUNCTIONAL STATUS - GENERAL
MOBILITY SCORE: 18
MOVING FROM LYING ON BACK TO SITTING ON SIDE OF FLAT BED: A LITTLE
SUGGESTED CMS G CODE MODIFIER MOBILITY: CK
WALKING IN HOSPITAL ROOM: A LITTLE
MOVING TO AND FROM BED TO CHAIR: A LITTLE
CLIMB 3 TO 5 STEPS WITH RAILING: A LITTLE
TURNING FROM BACK TO SIDE WHILE IN FLAT BAD: A LITTLE
STANDING UP FROM CHAIR USING ARMS: A LITTLE

## 2024-05-06 ASSESSMENT — PAIN DESCRIPTION - PAIN TYPE
TYPE: SURGICAL PAIN
TYPE: SURGICAL PAIN
TYPE: ACUTE PAIN
TYPE: SURGICAL PAIN

## 2024-05-06 ASSESSMENT — ENCOUNTER SYMPTOMS
GASTROINTESTINAL NEGATIVE: 1
COUGH: 0
FALLS: 1
NEUROLOGICAL NEGATIVE: 1
CONSTITUTIONAL NEGATIVE: 1
CARDIOVASCULAR NEGATIVE: 1

## 2024-05-06 ASSESSMENT — GAIT ASSESSMENTS
DEVIATION: BRADYKINETIC
ASSISTIVE DEVICE: FRONT WHEEL WALKER
DISTANCE (FEET): 110
GAIT LEVEL OF ASSIST: CONTACT GUARD ASSIST

## 2024-05-06 NOTE — DISCHARGE PLANNING
[facility] Transitional Care Coordination    Referral from: Dr Montgomery  Insurance Provider on Facesheet: Medicare  Potential Rehab Diagnosis: Hip fx    Chart review indicates patient may have on going medical management and may have therapy needs to possibly meet inpatient rehab facility criteria with the goal of returning to community.    D/C support: Daughter     Physiatry consultation forwarded per protocol.     Per physiatry patient is not a candidate for IPR, lacks discharge support. TCC will no longer follow.     Thank you for the referral.

## 2024-05-06 NOTE — PROGRESS NOTES
"      Trauma tertiary and SBIRT per trauma guidelines and quality measures. This note does not constitute as a formal trauma consult. Please defer all management to primary team.     Patient admitted to medical team for right hip fracture after falling out of bed    Mental status adequate for full examination?: Yes    Spine cleared (radiologically and/or clinically): Yes    REVIEW OF SYSTEMS:  Review of Systems   Constitutional: Negative.    Respiratory:  Negative for cough.    Cardiovascular: Negative.    Gastrointestinal: Negative.    Genitourinary: Negative.    Musculoskeletal:  Positive for falls and joint pain.   Neurological: Negative.        PHYSICAL EXAMINATION:  BP (!) 163/78   Pulse 60   Temp 36.2 °C (97.2 °F) (Temporal)   Resp 16   Ht 1.626 m (5' 4\")   Wt 59 kg (130 lb)   SpO2 99%   BMI 22.31 kg/m²   Physical Exam  Vitals and nursing note reviewed. Exam conducted with a chaperone present.   Constitutional:       Appearance: Normal appearance.   HENT:      Right Ear: External ear normal.      Left Ear: External ear normal.      Nose: Nose normal.      Mouth/Throat:      Mouth: Mucous membranes are moist.   Cardiovascular:      Rate and Rhythm: Normal rate and regular rhythm.      Pulses: Normal pulses.      Heart sounds: Normal heart sounds.   Pulmonary:      Effort: Pulmonary effort is normal.   Abdominal:      General: There is no distension.   Musculoskeletal:         General: Tenderness and signs of injury present.      Cervical back: Normal range of motion.      Comments: Postsurgical dressing not visualized   Skin:     Coloration: Skin is not jaundiced or pale.   Neurological:      General: No focal deficit present.      Mental Status: She is alert and oriented to person, place, and time.   Psychiatric:         Mood and Affect: Mood normal.         LABORATORY VALUES:  Recent Labs     05/04/24  0654 05/05/24  0751 05/06/24  0857   WBC 12.2* 12.9* 9.8   RBC 4.02* 4.13* 4.06*   HEMOGLOBIN 12.2 " 12.2 11.9*   HEMATOCRIT 36.0* 38.1 37.3   MCV 89.6 92.3 91.9   MCH 30.3 29.5 29.3   MCHC 33.9 32.0* 31.9*   RDW 46.0 47.8 48.8   PLATELETCT 169 166 156*   MPV 10.4 10.6 10.5     Recent Labs     05/04/24  0412 05/05/24  0751   SODIUM 141 140   POTASSIUM 3.7 3.5*   CHLORIDE 107 107   CO2 22 23   GLUCOSE 150* 143*   BUN 29* 19   CREATININE 0.56 0.57   CALCIUM 8.9 8.8               IMAGING:  DX-PORTABLE FLUORO > 1 HOUR   Final Result      Portable fluoroscopy utilized for 17 seconds.      INTERPRETING LOCATION: [address] [facility], [address]      DX-HIP-COMPLETE - UNILATERAL 2+ RIGHT   Final Result      Digitized intraoperative radiograph is submitted for review. This examination is not for diagnostic purpose but for guidance during a surgical procedure. Please see the patient's chart for full procedural details.         INTERPRETING LOCATION: [address] [facility], [facility], [address]      DX-CHEST-LIMITED (1 VIEW)   Final Result         No acute cardiac or pulmonary abnormality is identified.      CT-SHOULDER W/O PLUS RECONS RIGHT   Final Result      1.  No fracture or dislocation of RIGHT shoulder.   2.  Mild degenerative changes.      CT-HEAD W/O   Final Result         1.  No acute intracranial abnormality is identified, there are nonspecific white matter changes, commonly associated with small vessel ischemic disease.  Associated mild cerebral atrophy is noted.   2.  Mild bilateral ventricular dilatation, could represent ex vacuo changes, consider component of normal pressure hydrocephalus with additional workup as clinically appropriate   3.  Atherosclerosis.         DX-HIP-COMPLETE - UNILATERAL 2+ RIGHT   Final Result         1.  Impacted subcapital right femoral neck fracture.      DX-SHOULDER 2+ RIGHT   Final Result         1.  No acute traumatic bony injury.   2.  Right infrahilar infiltrate             RAP Score Total: 0      CAGE Results: not completed Blood Alcohol>0.08: not completed       PDI Not completed.    Newly  identified diagnoses, injuries and/or co-morbidities:      Trauma tertiary and SBIRT per trauma guidelines and quality measures. This note does not constitute as a formal trauma consult. Please defer all management to primary team.

## 2024-05-06 NOTE — PROGRESS NOTES
"      Orthopaedic Progress Note    Interval changes:  Patient doing well    RLE dressings are CDI  Cleared for DC to SNF by ortho pending medicine clearance    ROS - Patient denies any new issues.  Pain well controlled.    BP (!) 163/78   Pulse 60   Temp 36.2 °C (97.2 °F) (Temporal)   Resp 16   Ht 1.626 m (5' 4\")   Wt 59 kg (130 lb)   SpO2 99%     Patient seen and examined  No acute distress  Breathing non labored  RRR  RLE surgical dressing is clean, dry, and intact. Patient clearly fires tibialis anterior, EHL, and gastrocnemius/soleus. Sensation is intact to light touch throughout superficial peroneal, deep peroneal, tibial, saphenous, and sural nerve distributions. Strong and palpable 2+ dorsalis pedis and posterior tibial pulses with capillary refill less than 2 seconds. No lower leg tenderness or discomfort.     Recent Labs     05/04/24  0654 05/05/24  0751 05/06/24  0857   WBC 12.2* 12.9* 9.8   RBC 4.02* 4.13* 4.06*   HEMOGLOBIN 12.2 12.2 11.9*   HEMATOCRIT 36.0* 38.1 37.3   MCV 89.6 92.3 91.9   MCH 30.3 29.5 29.3   MCHC 33.9 32.0* 31.9*   RDW 46.0 47.8 48.8   PLATELETCT 169 166 156*   MPV 10.4 10.6 10.5       Active Hospital Problems    Diagnosis     Leucocytosis [D72.829]     Hip fracture requiring operative repair, right, closed, initial encounter ([facility]) [S72.001A]     Acute pain of right shoulder [M25.511]     Hypertension [I10]     Hyperglycemia [R73.9]     Dehydration [E86.0]     ACP (advance care planning) [Z71.89]        Assessment/Plan:  Patient doing well    RLE dressings are CDI  Cleared for DC to SNF by ortho pending medicine clearance  POD#2 S/P Percutaneous fixation of femoral fracture, proximal end, neck  Wt bearing status - WBAT  Wound care/Drains - Dressings to be changed every other day by nursing. Or PRN for saturation starting POD#2  Future Procedures - none planned   Lovenox: Start 5/5, Duration-until ambulatory > 150'  Sutures/Staples out- 14-21 days post operatively. Removal will " completed by ortho mid levels only.  PT/OT-initiated  Antibiotics: Perioperative completed  DVT Prophylaxis- TEDS/SCDs/Foot pumps  Mcgrath-not needed per ortho  Case Coordination for Discharge Planning - Disposition per therapy recs.

## 2024-05-06 NOTE — THERAPY
Physical Therapy   Daily Treatment     Patient Name: Shira Hollins  Age:  78 y.o., Sex:  female  Medical Record #: [id number]  Today's Date: 5/6/2024     Precautions  Precautions: (P) Fall Risk;Heel Weight Bearing Left Lower Extremity    Assessment    Pt is agreeable to PT.  She reports pain in the RUE only, needs reminders that she had a R hip surgery.  Pt ws able to complete mobility with SBA/ CGA /x STS from the toilet, which required Solis d/t lower surface.  She became lightheaded /p stairs that did not resolve even after sitting down for several minutes.  She was brought back to her room in a w/c and returned to the bed, at which time she reported it went away.  Nurse was notified.  Pt is limited by weaknes, decreased activity tolerance, and impaired cognition limiting carry over.  PT will continue to follow in house.  Recommend post acute PT.    Plan    Treatment Plan Status: (P) Continue Current Treatment Plan  Type of Treatment: Bed Mobility, Equipment, Gait Training, Neuro Re-Education / Balance, Self Care / Home Evaluation, Stair Training, Therapeutic Exercise, Therapeutic Activities  Treatment Frequency: 5 Times per Week  Treatment Duration: Until Therapy Goals Met    DC Equipment Recommendations: (P) Unable to determine at this time  Discharge Recommendations: (P) Recommend post-acute placement for additional physical therapy services prior to discharge home         Objective       05/06/24 1100   Precautions   Precautions Fall Risk;Heel Weight Bearing Left Lower Extremity   Vitals   O2 Delivery Device None - Room Air   Vitals Comments O2 off when PT entered ethe room   Pain 0 - 10 Group   Therapist Pain Assessment During Activity;5  (R shoulder)   Cognition    Level of Consciousness Alert   Ability To Follow Commands 1 Step   New Learning Impaired   Sequencing Impaired   Comments Pt required reminders that she had a hip sx.  Pt focusing on the R shoulder pain.   Balance   Sitting Balance (Static) Fair    Sitting Balance (Dynamic) Fair   Standing Balance (Static) Fair -   Standing Balance (Dynamic) Fair -   Weight Shift Sitting Fair   Weight Shift Standing Fair   Bed Mobility    Supine to Sit Standby Assist   Sit to Supine Standby Assist   Scooting Standby Assist   Skilled Intervention Sequencing;Tactile Cuing;Verbal Cuing   Comments HOB elevated, rail   Gait Analysis   Gait Level Of Assist Contact Guard Assist   Assistive Device Front Wheel Walker   Distance (Feet) 110   # of Times Distance was Traveled 1   Deviation Bradykinetic   # of Stairs Climbed 2  (B rails, VCs for sequencing)   Level of Assist with Stairs Contact Guard Assist   Skilled Intervention Sequencing;Tactile Cuing;Verbal Cuing   Functional Mobility   Sit to Stand Contact Guard Assist   Bed, Chair, Wheelchair Transfer Contact Guard Assist   Toilet Transfers Minimal Assist   Transfer Method Stand Step   Mobility with FWW   Skilled Intervention Facilitation;Sequencing;Tactile Cuing;Verbal Cuing   Activity Tolerance   Sitting in Chair 6 min   Short Term Goals    Short Term Goal # 1 Pt will perform supine<>sit with HOB flat and SPV within 6 visits.   Goal Outcome # 1 Progressing as expected   Short Term Goal # 2 Pt will perform STS with FWW and SPV within 6 visits.   Goal Outcome # 2 Progressing as expected   Short Term Goal # 3 Pt will amb 150ft with FWW and SPV within 6 visits.   Goal Outcome # 3 Progressing as expected   Short Term Goal # 4 Pt will ascend/descen1 step with FWW and SPV within 6 visits.   Goal Outcome # 4 Progressing as expected   Education Group   Education Provided Stair Training   Gait Training Patient Response Patient;Acceptance;Explanation;Action Demonstration;Reinforcement Needed   Stair Training Patient Response Patient;Acceptance;Explanation;Action Demonstration;Reinforcement Needed   Physical Therapy Treatment Plan   Physical Therapy Treatment Plan Continue Current Treatment Plan   Anticipated Discharge Equipment and  Recommendations   DC Equipment Recommendations Unable to determine at this time   Discharge Recommendations Recommend post-acute placement for additional physical therapy services prior to discharge home   Interdisciplinary Plan of Care Collaboration   IDT Collaboration with  Nursing   Patient Position at End of Therapy In Bed;Call Light within Reach;Tray Table within Reach   Collaboration Comments RN updated   Session Information   Date / Session Number  5/6 -2 (2/5, 5/11)

## 2024-05-06 NOTE — CARE PLAN
The patient is Stable - Low risk of patient condition declining or worsening    Shift Goals  Clinical Goals: pain control, safety  Patient Goals: rest  Family Goals: n/a    Progress made toward(s) clinical / shift goals:    Problem: Pain - Standard  Goal: Alleviation of pain or a reduction in pain to the patient’s comfort goal  Outcome: Progressing     Problem: Fall Risk  Goal: Patient will remain free from falls  Outcome: Progressing       Patient is not progressing towards the following goals:

## 2024-05-06 NOTE — PROGRESS NOTES
Hospital Medicine Daily Progress Note    Date of Service  5/6/2024    Chief Complaint  Shira Hollins is a 78 y.o. female admitted 5/4/2024 with right hip pain    Hospital Course    Shira Hollins is a 78 y.o. female who presented 5/4/2024 with evaluation for ground-level fall. Noted to have impacted right subcapital femoral neck fracture.   S/p Percutaneous fixation of femoral fracture.  started on Macrobid for UTI.  PT/OT recommending placement.   assisting    Interval Problem Update    5/6/2024  Elevated blood pressure  Pain well-controlled  Labs reviewed normal white count, hemoglobin 11.9, UA positive for UTI.,  Urine culture growing E. coli      Continue on Macrobid  Started on Norvasc 10 mg daily  Requiring IV narcotics for pain management, monitor for toxicity  I have ordered repeat BMP in a.m. to monitor electrolytes  Declined by rehab.  Case discussed with physical medicine    PT/OT recommending postacute placement.   assisting with placement    I have discussed this patient's plan of care and discharge plan at IDT rounds today with Case Management, Nursing, Nursing leadership, and other members of the IDT team.    Consultants/Specialty  orthopedics    Code Status  Full Code    Disposition  The patient is not medically cleared for discharge to home or a post-acute facility.  Anticipate discharge to: skilled nursing facility    I have placed the appropriate orders for post-discharge needs.    Review of Systems  Review of Systems   Musculoskeletal:  Positive for falls and joint pain.        Physical Exam  Temp:  [36.2 °C (97.2 °F)-36.5 °C (97.7 °F)] 36.2 °C (97.2 °F)  Pulse:  [60-63] 60  Resp:  [16-17] 16  BP: (147-163)/(71-78) 163/78  SpO2:  [98 %-99 %] 99 %    Physical Exam  Constitutional:       Appearance: Normal appearance.   Cardiovascular:      Rate and Rhythm: Normal rate and regular rhythm.      Pulses: Normal pulses.      Heart sounds: Normal heart sounds.    Pulmonary:      Effort: Pulmonary effort is normal.   Abdominal:      General: There is no distension.   Musculoskeletal:      Comments: Range of motion limited in right hip   Neurological:      General: No focal deficit present.      Mental Status: She is alert and oriented to person, place, and time.   Psychiatric:         Mood and Affect: Mood normal.         Fluids  No intake or output data in the 24 hours ending 05/06/24 1558    Laboratory  Recent Labs     05/04/24  0654 05/05/24  0751 05/06/24  0857   WBC 12.2* 12.9* 9.8   RBC 4.02* 4.13* 4.06*   HEMOGLOBIN 12.2 12.2 11.9*   HEMATOCRIT 36.0* 38.1 37.3   MCV 89.6 92.3 91.9   MCH 30.3 29.5 29.3   MCHC 33.9 32.0* 31.9*   RDW 46.0 47.8 48.8   PLATELETCT 169 166 156*   MPV 10.4 10.6 10.5     Recent Labs     05/04/24  0412 05/05/24  0751   SODIUM 141 140   POTASSIUM 3.7 3.5*   CHLORIDE 107 107   CO2 22 23   GLUCOSE 150* 143*   BUN 29* 19   CREATININE 0.56 0.57   CALCIUM 8.9 8.8                   Imaging  DX-PORTABLE FLUORO > 1 HOUR   Final Result      Portable fluoroscopy utilized for 17 seconds.      INTERPRETING LOCATION: [address] [place] [address], [id number]      DX-HIP-COMPLETE - UNILATERAL 2+ RIGHT   Final Result      Digitized intraoperative radiograph is submitted for review. This examination is not for diagnostic purpose but for guidance during a surgical procedure. Please see the patient's chart for full procedural details.         INTERPRETING LOCATION: [address] [place] [address], [id number]      DX-CHEST-LIMITED (1 VIEW)   Final Result         No acute cardiac or pulmonary abnormality is identified.      CT-SHOULDER W/O PLUS RECONS RIGHT   Final Result      1.  No fracture or dislocation of RIGHT shoulder.   2.  Mild degenerative changes.      CT-HEAD W/O   Final Result         1.  No acute intracranial abnormality is identified, there are nonspecific white matter changes, commonly associated with small vessel ischemic disease.  Associated mild cerebral atrophy is noted.    2.  Mild bilateral ventricular dilatation, could represent ex vacuo changes, consider component of normal pressure hydrocephalus with additional workup as clinically appropriate   3.  Atherosclerosis.         DX-HIP-COMPLETE - UNILATERAL 2+ RIGHT   Final Result         1.  Impacted subcapital right femoral neck fracture.      DX-SHOULDER 2+ RIGHT   Final Result         1.  No acute traumatic bony injury.   2.  Right infrahilar infiltrate              Assessment/Plan  * Hip fracture requiring operative repair, right, closed, initial encounter (HCC)- (present on admission)  Assessment & Plan  S/p Percutaneous fixation of femoral fracture.      PT/OT recommending postacute placement  PMR consulted  Requiring IV narcotics for pain management, monitor for toxicity  Case discussed with orthopedics KURT Morillo    Acute UTI  Assessment & Plan  UA positive for UTI.,  Urine culture growing E. Coli.  Continue on Macrobid    Leucocytosis  Assessment & Plan  Due to UTI  Improved with antibiotic    ACP (advance care planning)  Assessment & Plan  Full code    Dehydration  Assessment & Plan  IVF    Hyperglycemia  Assessment & Plan  A1c 5.7      Hypertension  Assessment & Plan  Increase Norvasc to 10 mg daily    Acute pain of right shoulder  Assessment & Plan  CT right shoulder with no fracture or dislocation  Pain improved         VTE prophylaxis: lovenox    I have performed a physical exam and reviewed and updated ROS and Plan today (5/6/2024). In review of yesterday's note (5/5/2024), there are no changes except as documented above.

## 2024-05-06 NOTE — CARE PLAN
The patient is Stable - Low risk of patient condition declining or worsening    Shift Goals  Clinical Goals: mobility; increase po intake  Patient Goals: comfort;  Family Goals: dc plan    Progress made toward(s) clinical / shift goals:    Problem: Pain - Standard  Goal: Alleviation of pain or a reduction in pain to the patient’s comfort goal  Outcome: Progressing     Problem: Skin Integrity  Goal: Skin integrity is maintained or improved  Outcome: Progressing     Problem: Fall Risk  Goal: Patient will remain free from falls  Outcome: Progressing       Patient is not progressing towards the following goals:

## 2024-05-06 NOTE — DISCHARGE PLANNING
0814: [place] PASRR: [id number]  Patient lives alone in [facility] where she has previously been independent with ADLs and some IADLs (Daughter assists with driving, shopping, etc). She has not been using any assistive devices at home. Daughter is working on finding patient a more supportive housing situation.   She is having increased confusion in hospital setting and daughter reports patient has been having cognitive issues.   PT/OT recommending placement.   Declined by IPR due to lack of discharge support.   [facility] referrals sent today, 5/6/2024. Pending acceptance. PASRR obtained. Pending medical clearance and 3 midnight requirement for Medicare.   1345: RN CM left choice form with highlighted accepting facilities in patient's room per patient's daughter's request. Daughter will be coming to bedside this evening to complete form (patient currently lacking capacity to make decisions for herself). Anticipating discharge to post-acute facility tomorrow after 3 midnight criteria. IDT notified.     Case Management Discharge Planning    Admission Date: 5/4/2024  GMLOS: 4.5  ALOS: 2    6-Clicks ADL Score: 16  6-Clicks Mobility Score: 17  PT and/or OT Eval ordered: Yes  Post-acute Referrals Ordered: Yes  Post-acute Choice Obtained: No  Has referral(s) been sent to post-acute provider:  Yes      Anticipated Discharge Dispo: Discharge Disposition: D/T to SNF with Medicare cert in anticipation of skilled care (03)    DME Needed: Yes    DME Ordered: No    Action(s) Taken: Updated Provider/Nurse on Discharge Plan, DC Assessment Complete (See below), Referral(s) sent, and Completed PASSR/LOC    Escalations Completed: Provider, PT, and Pending Discharge Destination    Medically Clear: No    Next Steps: Pending 3 midnight stay and post-acute choice from daughter.     Barriers to Discharge: Medical clearance and Pending Placement    Is the patient up for discharge tomorrow: Unknown    Care Transition Team  Assessment    Information Source  Orientation Level: Oriented to place, Oriented to situation, Oriented to person, Disoriented to time  Information Given By: Other (Comments) (Chart Review)  Informant's Name: [place]  Who is responsible for making decisions for patient? : Patient    Readmission Evaluation  Is this a readmission?: No    Elopement Risk  Legal Hold: No  Ambulatory or Self Mobile in Wheelchair: Yes  Disoriented: No  Psychiatric Symptoms: None  History of Wandering: No  Elopement this Admit: No  Vocalizing Wanting to Leave: No  Displays Behaviors, Body Language Wanting to Leave: No-Not at Risk for Elopement  Elopement Risk: Not at Risk for Elopement    Interdisciplinary Discharge Planning  Does Admitting Nurse Feel This Could be a Complex Discharge?: No  Lives with - Patient's Self Care Capacity: Alone and Unable to Care For Self  Patient or legal guardian wants to designate a caregiver: No  Housing / Facility: Mot ([facility])  Prior Services: Home-Independent    Discharge Preparedness  What is your plan after discharge?: Skilled nursing facility  What are your discharge supports?: Child  Prior Functional Level: Ambulatory, Independent with Activities of Daily Living, Independent with Medication Management  Difficulity with ADLs: None  Difficulity with IADLs: Driving, Laundry, Shopping  Difficulity with IADL Comments: Daughter assists with IADLs 2x weekly    Functional Assesment  Prior Functional Level: Ambulatory, Independent with Activities of Daily Living, Independent with Medication Management    Finances  Financial Barriers to Discharge: No  Prescription Coverage: Yes    Values / Beliefs / Concerns  Values / Beliefs Concerns : No    Advance Directive  Advance Directive?: None    Domestic Abuse  Have you ever been the victim of abuse or violence?: No    Psychological Assessment  History of Substance Abuse: None  History of Psychiatric Problems: No  Non-compliant with  Treatment: No  Newly Diagnosed Illness: Yes    Discharge Risks or Barriers  Discharge risks or barriers?: Lives alone, no community support, Complex medical needs  Patient risk factors: Cognitive / sensory / physical deficit, Complex medical needs    Anticipated Discharge Information  Discharge Disposition: D/T to SNF with Medicare cert in anticipation of skilled care (03)

## 2024-05-06 NOTE — HOSPITAL COURSE
Shira Hollins is a 78 y.o. female who presented 5/4/2024 with evaluation for ground-level fall. Noted to have impacted right subcapital femoral neck fracture.   S/p Percutaneous fixation of femoral fracture.  started on Macrobid for UTI.  PT/OT recommending placement.  Case manage assisting

## 2024-05-07 VITALS
HEART RATE: 85 BPM | TEMPERATURE: 97.5 F | HEIGHT: 64 IN | BODY MASS INDEX: 22.2 KG/M2 | WEIGHT: 130 LBS | SYSTOLIC BLOOD PRESSURE: 157 MMHG | RESPIRATION RATE: 17 BRPM | DIASTOLIC BLOOD PRESSURE: 84 MMHG | OXYGEN SATURATION: 93 %

## 2024-05-07 PROBLEM — R73.9 HYPERGLYCEMIA: Status: RESOLVED | Noted: 2024-05-04 | Resolved: 2024-05-07

## 2024-05-07 PROBLEM — M25.511 ACUTE PAIN OF RIGHT SHOULDER: Status: RESOLVED | Noted: 2024-05-04 | Resolved: 2024-05-07

## 2024-05-07 PROBLEM — E86.0 DEHYDRATION: Status: RESOLVED | Noted: 2024-05-04 | Resolved: 2024-05-07

## 2024-05-07 PROBLEM — D72.829 LEUCOCYTOSIS: Status: RESOLVED | Noted: 2024-05-05 | Resolved: 2024-05-07

## 2024-05-07 LAB
ANION GAP SERPL CALC-SCNC: 10 MMOL/L (ref 7–16)
BACTERIA UR CULT: ABNORMAL
BACTERIA UR CULT: ABNORMAL
BUN SERPL-MCNC: 17 MG/DL (ref 8–22)
CALCIUM SERPL-MCNC: 8.7 MG/DL (ref 8.5–10.5)
CHLORIDE SERPL-SCNC: 108 MMOL/L (ref 96–112)
CO2 SERPL-SCNC: 26 MMOL/L (ref 20–33)
CREAT SERPL-MCNC: 0.53 MG/DL (ref 0.5–1.4)
GFR SERPLBLD CREATININE-BSD FMLA CKD-EPI: 93 ML/MIN/1.73 M 2
GLUCOSE SERPL-MCNC: 105 MG/DL (ref 65–99)
MAGNESIUM SERPL-MCNC: 1.9 MG/DL (ref 1.5–2.5)
POTASSIUM SERPL-SCNC: 3.6 MMOL/L (ref 3.6–5.5)
SIGNIFICANT IND 70042: ABNORMAL
SITE SITE: ABNORMAL
SODIUM SERPL-SCNC: 144 MMOL/L (ref 135–145)
SOURCE SOURCE: ABNORMAL

## 2024-05-07 PROCEDURE — 99239 HOSP IP/OBS DSCHRG MGMT >30: CPT | Performed by: INTERNAL MEDICINE

## 2024-05-07 RX ORDER — AMLODIPINE BESYLATE 10 MG/1
10 TABLET ORAL
Status: DISCONTINUED | OUTPATIENT
Start: 2024-05-08 | End: 2024-05-07 | Stop reason: HOSPADM

## 2024-05-07 RX ORDER — NITROFURANTOIN 25; 75 MG/1; MG/1
100 CAPSULE ORAL 2 TIMES DAILY WITH MEALS
Qty: 2 CAPSULE | Refills: 0 | Status: ACTIVE | OUTPATIENT
Start: 2024-05-07 | End: 2024-05-08

## 2024-05-07 RX ORDER — LOSARTAN POTASSIUM 25 MG/1
25 TABLET ORAL DAILY
Qty: 30 TABLET | Refills: 0 | Status: SHIPPED | OUTPATIENT
Start: 2024-05-07

## 2024-05-07 RX ORDER — IBUPROFEN 600 MG/1
600 TABLET ORAL EVERY 6 HOURS PRN
Qty: 30 TABLET | Refills: 0 | Status: SHIPPED | OUTPATIENT
Start: 2024-05-07

## 2024-05-07 RX ORDER — ACETAMINOPHEN 500 MG
1000 TABLET ORAL EVERY 6 HOURS PRN
Qty: 30 TABLET | Refills: 0 | Status: SHIPPED | OUTPATIENT
Start: 2024-05-07

## 2024-05-07 RX ORDER — LIDOCAINE 4 G/G
1 PATCH TOPICAL EVERY 24 HOURS
Qty: 10 PATCH | Refills: 0 | Status: SHIPPED | OUTPATIENT
Start: 2024-05-08

## 2024-05-07 RX ADMIN — KETOROLAC TROMETHAMINE 15 MG: 15 INJECTION, SOLUTION INTRAMUSCULAR; INTRAVENOUS at 11:53

## 2024-05-07 RX ADMIN — Medication 1000 UNITS: at 04:22

## 2024-05-07 RX ADMIN — AMLODIPINE BESYLATE 5 MG: 5 TABLET ORAL at 04:22

## 2024-05-07 RX ADMIN — ACETAMINOPHEN 650 MG: 325 TABLET, FILM COATED ORAL at 11:53

## 2024-05-07 RX ADMIN — ACETAMINOPHEN 650 MG: 325 TABLET, FILM COATED ORAL at 04:22

## 2024-05-07 RX ADMIN — NITROFURANTOIN MONOHYDRATE/MACROCRYSTALS 100 MG: 75; 25 CAPSULE ORAL at 09:25

## 2024-05-07 RX ADMIN — DOCUSATE SODIUM 100 MG: 100 CAPSULE, LIQUID FILLED ORAL at 04:22

## 2024-05-07 ASSESSMENT — PAIN DESCRIPTION - PAIN TYPE
TYPE: ACUTE PAIN

## 2024-05-07 NOTE — PROGRESS NOTES
IV removed. Discharge paperwork discussed. All questions answered. Follow up appointment discussed. Patient discharged to [facility] via wheelchair with GMT. Patient has all personal belongings.

## 2024-05-07 NOTE — DISCHARGE PLANNING
@6656  Agency/Facility Name: [facility]  Spoke To: Annel  Outcome: Per Annel they do have beds available for today and can take the pt. Requested for   3 pm transport time and PASSR to be completed. I notified BABAR Messina.      @1121  Scanned SNF Choice form in media.      @5190  Called Annel at [facility] advising wheelchair transport is scheduled via GMT at 1500 today.

## 2024-05-07 NOTE — CARE PLAN
The patient is Stable - Low risk of patient condition declining or worsening     Problem: Fall Risk  Goal: Patient will remain free from falls  Outcome: Progressing   Working with patient to decrease risk of falls after surgery.   Educated patient on interventions and patient verbalized understanding.    - using non skid socks   - using correct DME and putting in bathroom when not in use   - using call light before getting up    - getting out of bed on stronger side with use of staff   - bed in lowest in locked position with alarm on     Problem: Bowel Elimination  Goal: Establish and maintain regular bowel function  Outcome: Progressing    Working with patient to plan and participate in a bowel regimen following surgery.   Educated patient on importance of taking daily stool softeners, eating balanced meals, drinking adequate's amount of water and mobilizing throughout the day. Patient verbalized education.      Shift Goals  Clinical Goals: safety, rest,   Patient Goals: rest  Family Goals: na    Progress made toward(s) clinical / shift goals:    - called before getting out bed, using DME to ambulate, administered stool softeners and education re-enforced     Patient is not progressing towards the following goals:

## 2024-05-07 NOTE — DISCHARGE SUMMARY
Discharge Summary    CHIEF COMPLAINT ON ADMISSION  Chief Complaint   Patient presents with    Shoulder Pain     R shoulder pain post GLF     Hip Pain     R hip pain post GLF     T-5000 GLF     Slipped off edge of bed. Denies head strike, -LOC, -thinners. C/o R hip and R shoulder pain after fall. CMS intact.        Reason for Admission  Hip fracture requiring operative repair     Admission Date  5/4/2024    CODE STATUS  Full Code    HPI & HOSPITAL COURSE    Shira Hollins is a 78 y.o. female who presented 5/4/2024 with evaluation for ground-level fall. Patient was noted to have an impacted right subcapital femoral neck fracture. She underwent percutaneous fixation of femoral fracture by Dr Viera on 5/4/24. She is WBAT.  Patient was also noted to have a UTI and was  started on Macrobid.  PT/OT evaluated the patient and recommended placement.  Patient was hypertensive and was started on losartan. Her pain was well controlled with tylenol and Toradol. She is cleared for discharge to SNF.        Therefore, she is discharged in good and stable condition to skilled nursing facility.    The patient met 2-midnight criteria for an inpatient stay at the time of discharge.    Discharge Date  5/7/24    FOLLOW UP ITEMS POST DISCHARGE  PCP and Ortho    DISCHARGE DIAGNOSES  Principal Problem:    Hip fracture requiring operative repair, right, closed, initial encounter (HCC) (POA: Yes)  Active Problems:    Acute pain of right shoulder (POA: Unknown)    Hypertension (POA: Unknown)    Hyperglycemia (POA: Unknown)    Dehydration (POA: Unknown)    ACP (advance care planning) (POA: Unknown)    Leucocytosis (POA: Unknown)    Acute UTI (POA: Unknown)  Resolved Problems:    * No resolved hospital problems. *      FOLLOW UP  No future appointments.  No follow-up provider specified.    MEDICATIONS ON DISCHARGE     Medication List        START taking these medications        Instructions   acetaminophen 500 MG Tabs  Commonly known as:  Tylenol   Take 2 Tablets by mouth every 6 hours as needed for Mild Pain.  Dose: 1,000 mg     ibuprofen 600 MG Tabs  Commonly known as: Motrin   Take 1 Tablet by mouth every 6 hours as needed for Mild Pain or Moderate Pain.  Dose: 600 mg     lidocaine 4 % Ptch  Start taking on: May 8, 2024  Commonly known as: Asperflex   Doctor's comments: Apply to affected area  Place 1 Patch on the skin every 24 hours.  Dose: 1 Patch     losartan 25 MG Tabs  Commonly known as: Cozaar   Take 1 Tablet by mouth every day.  Dose: 25 mg     nitrofurantoin 100 MG Caps  Commonly known as: Macrobid   Take 1 Capsule by mouth 2 times a day with meals for 1 day.  Dose: 100 mg     vitamin D 1000 UNIT Tabs  Start taking on: May 8, 2024  Commonly known as: Cholecalciferol   Take 1 Tablet by mouth every day.  Dose: 1,000 Units            CONTINUE taking these medications        Instructions   aspirin 81 MG Chew chewable tablet  Commonly known as: Asa   Chew 81 mg 1 time a day as needed for Mild Pain.  Dose: 81 mg              Allergies  No Known Allergies    DIET  Orders Placed This Encounter   Procedures    Diet Order Diet: Regular     Standing Status:   Standing     Number of Occurrences:   1     Order Specific Question:   Diet:     Answer:   Regular [1]       ACTIVITY  As tolerated.  Weight bearing as tolerated    CONSULTATIONS  Ortho Dr Viera    PROCEDURES  DATE OF OPERATION: 5/4/2024     PREOPERATIVE DIAGNOSIS: Right displaced femoral neck fracture     POSTOPERATIVE DIAGNOSIS: Same     PROCEDURE PERFORMED: Percutaneous fixation of femoral fracture, proximal end, neck    LABORATORY  Lab Results   Component Value Date    SODIUM 144 05/07/2024    POTASSIUM 3.6 05/07/2024    CHLORIDE 108 05/07/2024    CO2 26 05/07/2024    GLUCOSE 105 (H) 05/07/2024    BUN 17 05/07/2024    CREATININE 0.53 05/07/2024    CREATININE 0.7 12/19/2006        Lab Results   Component Value Date    WBC 9.8 05/06/2024    HEMOGLOBIN 11.9 (L) 05/06/2024    HEMATOCRIT 37.3  05/06/2024    PLATELETCT 156 (L) 05/06/2024        Total time of the discharge process exceeds 32 minutes.

## 2024-05-07 NOTE — PROGRESS NOTES
"    Orthopedic PA Progress Note    Interval changes:  Patient doing well.   RLE dressings are CDI  WBAT RLE  Follow up with the [facility] trauma [facility] clinic 2 weeks postop.  Cleared for DC from orthopedic standpoint     ROS - Patient denies any new issues.  Denies any numbness or tingling. Pain well controlled.    BP (!) 157/84   Pulse 85   Temp 36.4 °C (97.5 °F) (Temporal)   Resp 17   Ht 1.626 m (5' 4\")   Wt 59 kg (130 lb)   SpO2 93%     Patient seen and examined  No acute distress  Breathing non labored  RRR  RLE: Surgical dressing is clean, dry, and intact. Patient clearly fires tibialis anterior, EHL, and gastrocnemius/soleus. Sensation is intact to light touch throughout superficial peroneal, deep peroneal, tibial, saphenous, and sural nerve distributions. Strong and palpable 2+ dorsalis pedis and posterior tibial pulses with capillary refill less than 2 seconds.   Recent Labs     05/05/24  0751 05/06/24  0857   WBC 12.9* 9.8   RBC 4.13* 4.06*   HEMOGLOBIN 12.2 11.9*   HEMATOCRIT 38.1 37.3   MCV 92.3 91.9   MCH 29.5 29.3   MCHC 32.0* 31.9*   RDW 47.8 48.8   PLATELETCT 166 156*   MPV 10.6 10.5       Active Hospital Problems    Diagnosis     Acute UTI [N39.0]     Leucocytosis [D72.829]     Hip fracture requiring operative repair, right, closed, initial encounter ([facility]) [S72.001A]     Acute pain of right shoulder [M25.511]     Hypertension [I10]     Hyperglycemia [R73.9]     Dehydration [E86.0]     ACP (advance care planning) [Z71.89]        Assessment/Plan:  Patient doing well.   RLE dressings are CDI  WBAT RLE  Follow up with the [facility] trauma [facility] clinic 2 weeks postop.  Cleared for DC from orthopedic standpoint     POD#3 S/p  Percutaneous fixation of femoral fracture, proximal end, neck     Wt bearing status - WBAT RLE  Wound care/Drains - Dressings to be changed every other day by nursing. Or PRN for saturation starting POD#2  Future Procedures - None planned   Lovenox: Start 5/5, Duration-until ambulatory > " 150'  Sutures/Staples out- 14-21 days post operatively. Removal will completed by ortho YELITZA's unless transferred.  DVT Prophylaxis outpatient: ASA 81 mg PO BID x4 weeks  PT/OT-initiated  Antibiotics:  Perioperative completed  DVT Prophylaxis- TEDS/SCDs/Foot pumps  Mcgrath-not needed per ortho  Case Coordination for Discharge Planning - Disposition per therapy recs.

## 2024-05-07 NOTE — PROGRESS NOTES
Patient is A&Ox2. No complaints of pain. Plan of care discussed with the patient, all concerns addressed. Call light is within reach and patient educated on fall precautions, verbalized and demonstrated understanding. Bed in lowest and locked position. Hourly rounding in place.

## 2024-05-07 NOTE — DISCHARGE PLANNING
DC Transport Scheduled    Transport Company Scheduled:  [facility]    Scheduled Date: 5/7/2024  Scheduled Time: [id number]    Destination: [facility]  [address] [place]    Notified care team of scheduled transport via Voalte.     If there are any changes needed to the DC transportation scheduled, please contact [facility] at ext. [phone] between the hours of 5583-1530 Mon-Fri. If outside those hours, contact the ED Case Manager at ext. [phone].

## 2024-05-07 NOTE — DISCHARGE PLANNING
Patient medically cleared with multiple accepting post-acute facilities. Patient not fully oriented and will require communication with daughter, Ashleigh, regarding consent/IMM.   RN BABAR left message with Ashleigh at [phone], pending call back.     Case Management Discharge Planning    Admission Date: 5/4/2024  GMLOS: 4.5  ALOS: 3    6-Clicks ADL Score: 16  6-Clicks Mobility Score: 18  PT and/or OT Eval ordered: Yes  Post-acute Referrals Ordered: Yes  Post-acute Choice Obtained: No  Has referral(s) been sent to post-acute provider:  Yes      Anticipated Discharge Dispo: Discharge Disposition: D/T to SNF with Medicare cert in anticipation of skilled care (03)    DME Needed: No    Action(s) Taken: Updated Provider/Nurse on Discharge Plan and Patient Conference    Escalations Completed: Attempted to contact patient's daughter, Ashleigh, for SNF choice. Pending call back.     Medically Clear: Yes    Next Steps: Pending response from patient's daughter.     Barriers to Discharge: communication with family/decision maker    Is the patient up for discharge tomorrow: Today to SNF

## 2024-06-17 PROBLEM — D64.9 NORMOCYTIC ANEMIA: Status: ACTIVE | Noted: 2024-06-17

## 2024-06-17 PROBLEM — D69.6 THROMBOCYTOPENIA (HCC): Status: ACTIVE | Noted: 2024-06-17

## 2024-06-17 PROBLEM — E78.5 HYPERLIPIDEMIA: Status: ACTIVE | Noted: 2024-06-17

## 2024-06-17 PROBLEM — D72.9 NEUTROPHILIA: Status: ACTIVE | Noted: 2024-05-05

## 2024-06-17 PROBLEM — Z99.3 WHEELCHAIR DEPENDENT: Status: ACTIVE | Noted: 2024-06-17

## 2024-06-17 PROBLEM — F03.90 DEMENTIA (HCC): Status: ACTIVE | Noted: 2024-06-17

## 2024-06-25 ENCOUNTER — HOSPITAL ENCOUNTER (OUTPATIENT)
Facility: MEDICAL CENTER | Age: 79
End: 2024-06-25
Payer: MEDICARE

## 2024-06-25 DIAGNOSIS — D64.9 NORMOCYTIC ANEMIA: ICD-10-CM

## 2024-06-25 DIAGNOSIS — E78.00 PURE HYPERCHOLESTEROLEMIA: ICD-10-CM

## 2024-06-25 DIAGNOSIS — D69.6 THROMBOCYTOPENIA (HCC): ICD-10-CM

## 2024-06-25 DIAGNOSIS — S72.001A HIP FRACTURE REQUIRING OPERATIVE REPAIR, RIGHT, CLOSED, INITIAL ENCOUNTER (HCC): ICD-10-CM

## 2024-06-25 DIAGNOSIS — F02.80 OTHER FRONTOTEMPORAL DEMENTIA, UNSPECIFIED DEMENTIA SEVERITY, UNSPECIFIED WHETHER BEHAVIORAL, PSYCHOTIC, OR MOOD DISTURBANCE OR ANXIETY (HCC): ICD-10-CM

## 2024-06-25 DIAGNOSIS — G31.09 OTHER FRONTOTEMPORAL DEMENTIA, UNSPECIFIED DEMENTIA SEVERITY, UNSPECIFIED WHETHER BEHAVIORAL, PSYCHOTIC, OR MOOD DISTURBANCE OR ANXIETY (HCC): ICD-10-CM

## 2024-06-25 DIAGNOSIS — D72.9 NEUTROPHILIA: ICD-10-CM

## 2024-06-25 LAB
25(OH)D3 SERPL-MCNC: 22 NG/ML (ref 30–100)
ALBUMIN SERPL BCP-MCNC: 4 G/DL (ref 3.2–4.9)
ALBUMIN/GLOB SERPL: 1.9 G/DL
ALP SERPL-CCNC: 127 U/L (ref 30–99)
ALT SERPL-CCNC: 17 U/L (ref 2–50)
ANION GAP SERPL CALC-SCNC: 11 MMOL/L (ref 7–16)
AST SERPL-CCNC: 16 U/L (ref 12–45)
BASOPHILS # BLD AUTO: 0.6 % (ref 0–1.8)
BASOPHILS # BLD: 0.03 K/UL (ref 0–0.12)
BILIRUB SERPL-MCNC: 0.4 MG/DL (ref 0.1–1.5)
BUN SERPL-MCNC: 18 MG/DL (ref 8–22)
CALCIUM ALBUM COR SERPL-MCNC: 9.3 MG/DL (ref 8.5–10.5)
CALCIUM SERPL-MCNC: 9.3 MG/DL (ref 8.5–10.5)
CHLORIDE SERPL-SCNC: 107 MMOL/L (ref 96–112)
CO2 SERPL-SCNC: 27 MMOL/L (ref 20–33)
CREAT SERPL-MCNC: 0.55 MG/DL (ref 0.5–1.4)
EOSINOPHIL # BLD AUTO: 0.12 K/UL (ref 0–0.51)
EOSINOPHIL NFR BLD: 2.5 % (ref 0–6.9)
ERYTHROCYTE [DISTWIDTH] IN BLOOD BY AUTOMATED COUNT: 50.1 FL (ref 35.9–50)
GFR SERPLBLD CREATININE-BSD FMLA CKD-EPI: 92 ML/MIN/1.73 M 2
GLOBULIN SER CALC-MCNC: 2.1 G/DL (ref 1.9–3.5)
GLUCOSE SERPL-MCNC: 116 MG/DL (ref 65–99)
HCT VFR BLD AUTO: 38.2 % (ref 37–47)
HGB BLD-MCNC: 11.6 G/DL (ref 12–16)
IMM GRANULOCYTES # BLD AUTO: 0.01 K/UL (ref 0–0.11)
IMM GRANULOCYTES NFR BLD AUTO: 0.2 % (ref 0–0.9)
LYMPHOCYTES # BLD AUTO: 0.88 K/UL (ref 1–4.8)
LYMPHOCYTES NFR BLD: 18.1 % (ref 22–41)
MCH RBC QN AUTO: 28.9 PG (ref 27–33)
MCHC RBC AUTO-ENTMCNC: 30.4 G/DL (ref 32.2–35.5)
MCV RBC AUTO: 95.3 FL (ref 81.4–97.8)
MONOCYTES # BLD AUTO: 0.3 K/UL (ref 0–0.85)
MONOCYTES NFR BLD AUTO: 6.2 % (ref 0–13.4)
NEUTROPHILS # BLD AUTO: 3.51 K/UL (ref 1.82–7.42)
NEUTROPHILS NFR BLD: 72.4 % (ref 44–72)
NRBC # BLD AUTO: 0 K/UL
NRBC BLD-RTO: 0 /100 WBC (ref 0–0.2)
PLATELET # BLD AUTO: 212 K/UL (ref 164–446)
PMV BLD AUTO: 10.8 FL (ref 9–12.9)
POTASSIUM SERPL-SCNC: 3.9 MMOL/L (ref 3.6–5.5)
PROT SERPL-MCNC: 6.1 G/DL (ref 6–8.2)
RBC # BLD AUTO: 4.01 M/UL (ref 4.2–5.4)
SODIUM SERPL-SCNC: 145 MMOL/L (ref 135–145)
TSH SERPL DL<=0.005 MIU/L-ACNC: 1.78 UIU/ML (ref 0.38–5.33)
VIT B12 SERPL-MCNC: 778 PG/ML (ref 211–911)
WBC # BLD AUTO: 4.9 K/UL (ref 4.8–10.8)

## 2024-06-25 PROCEDURE — 84443 ASSAY THYROID STIM HORMONE: CPT

## 2024-06-25 PROCEDURE — 85025 COMPLETE CBC W/AUTO DIFF WBC: CPT

## 2024-06-25 PROCEDURE — 80053 COMPREHEN METABOLIC PANEL: CPT

## 2024-06-25 PROCEDURE — 82607 VITAMIN B-12: CPT

## 2024-06-25 PROCEDURE — 82306 VITAMIN D 25 HYDROXY: CPT | Mod: GZ

## (undated) DEVICE — SUTURE GENERAL

## (undated) DEVICE — BAG SPONGE COUNT 10.25 X 32 - BLUE (250/CA)

## (undated) DEVICE — SET EXTENSION WITH 2 PORTS (48EA/CA) ***PART #2C8610 IS A SUBSTITUTE*****

## (undated) DEVICE — TUBING CLEARLINK DUO-VENT - C-FLO (48EA/CA)

## (undated) DEVICE — SUTURE 2-0 VICRYL PLUS CT-1 - 8 X 18 INCH(12/BX)

## (undated) DEVICE — SLEEVE, VASO, THIGH, MED

## (undated) DEVICE — SENSOR OXIMETER ADULT SPO2 RD SET (20EA/BX)

## (undated) DEVICE — SET LEADWIRE 5 LEAD BEDSIDE DISPOSABLE ECG (1SET OF 5/EA)

## (undated) DEVICE — GOWN WARMING STANDARD FLEX - (30/CA)

## (undated) DEVICE — DRESSING TRANSPARENT FILM TEGADERM 4 X 4.75" (50EA/BX)"

## (undated) DEVICE — CANISTER SUCTION 3000ML MECHANICAL FILTER AUTO SHUTOFF MEDI-VAC NONSTERILE LF DISP  (40EA/CA)

## (undated) DEVICE — GUIDEPIN FOR 7.3MM CANNULATED SCREWS 2.8MM X 300MM (3TX6=18)(6EA/PK)

## (undated) DEVICE — SUCTION INSTRUMENT YANKAUER BULBOUS TIP W/O VENT (50EA/CA)

## (undated) DEVICE — PACK MAJOR ORTHO - (2EA/CA)

## (undated) DEVICE — LACTATED RINGERS INJ 1000 ML - (14EA/CA 60CA/PF)

## (undated) DEVICE — DRAPE U ORTHOPEDIC - (10/BX)

## (undated) DEVICE — COVER LIGHT HANDLE ALC PLUS DISP (18EA/BX)

## (undated) DEVICE — ELECTRODE DUAL RETURN W/ CORD - (50/PK)

## (undated) DEVICE — DRAPE 36X28IN RAD CARM BND BG - (25/CA) O

## (undated) DEVICE — STAPLER SKIN DISP - (6/BX 10BX/CA) VISISTAT

## (undated) DEVICE — GLOVE BIOGEL SZ 7.5 SURGICAL PF LTX - (50PR/BX 4BX/CA)

## (undated) DEVICE — DRAPE LARGE 3 QUARTER - (20/CA)

## (undated) DEVICE — GLOVE BIOGEL INDICATOR SZ 8 SURGICAL PF LTX - (50/BX 4BX/CA)

## (undated) DEVICE — CHLORAPREP 3 ML APPLICATOR - (25/BX 4BX/CS 100/CS)